# Patient Record
Sex: FEMALE | Race: WHITE | Employment: PART TIME | URBAN - METROPOLITAN AREA
[De-identification: names, ages, dates, MRNs, and addresses within clinical notes are randomized per-mention and may not be internally consistent; named-entity substitution may affect disease eponyms.]

---

## 2017-03-09 PROBLEM — Z3A.23 23 WEEKS GESTATION OF PREGNANCY: Status: ACTIVE | Noted: 2017-03-09

## 2017-03-09 PROBLEM — Z82.79 FAMILY HISTORY OF AUTOSOMAL ANEUPLOIDY: Status: ACTIVE | Noted: 2017-03-09

## 2017-03-09 PROBLEM — Z87.442 PERSONAL HISTORY OF KIDNEY STONES: Status: ACTIVE | Noted: 2017-03-09

## 2017-05-18 PROBLEM — Z3A.23 23 WEEKS GESTATION OF PREGNANCY: Status: RESOLVED | Noted: 2017-03-09 | Resolved: 2017-05-18

## 2017-05-18 PROBLEM — Z3A.33 33 WEEKS GESTATION OF PREGNANCY: Status: ACTIVE | Noted: 2017-05-18

## 2018-06-17 ENCOUNTER — HOSPITAL ENCOUNTER (EMERGENCY)
Age: 28
Discharge: HOME | End: 2018-06-17
Payer: COMMERCIAL

## 2018-06-17 VITALS
SYSTOLIC BLOOD PRESSURE: 117 MMHG | TEMPERATURE: 98.3 F | WEIGHT: 128 LBS | OXYGEN SATURATION: 98 % | BODY MASS INDEX: 24.19 KG/M2 | DIASTOLIC BLOOD PRESSURE: 80 MMHG | HEART RATE: 87 BPM | RESPIRATION RATE: 16 BRPM

## 2018-06-17 DIAGNOSIS — L50.9 URTICARIA: Primary | ICD-10-CM

## 2018-06-17 PROCEDURE — 99212 OFFICE O/P EST SF 10 MIN: CPT

## 2018-06-17 RX ORDER — DIPHENHYDRAMINE HCL 25 MG
25 CAPSULE ORAL EVERY 6 HOURS PRN
COMMUNITY
End: 2019-04-25 | Stop reason: ALTCHOICE

## 2018-06-17 RX ORDER — PREDNISONE 10 MG/1
TABLET ORAL
Qty: 20 TABLET | Refills: 0 | Status: SHIPPED | OUTPATIENT
Start: 2018-06-17 | End: 2019-04-25 | Stop reason: ALTCHOICE

## 2018-06-17 RX ORDER — FAMOTIDINE 20 MG/1
20 TABLET, FILM COATED ORAL 2 TIMES DAILY
Qty: 14 TABLET | Refills: 0 | Status: SHIPPED | OUTPATIENT
Start: 2018-06-17 | End: 2019-04-25 | Stop reason: ALTCHOICE

## 2019-02-15 ENCOUNTER — APPOINTMENT (OUTPATIENT)
Dept: GENERAL RADIOLOGY | Age: 29
End: 2019-02-15
Payer: COMMERCIAL

## 2019-02-15 ENCOUNTER — HOSPITAL ENCOUNTER (EMERGENCY)
Age: 29
Discharge: HOME | End: 2019-02-15
Payer: COMMERCIAL

## 2019-02-15 VITALS
DIASTOLIC BLOOD PRESSURE: 68 MMHG | WEIGHT: 125 LBS | SYSTOLIC BLOOD PRESSURE: 102 MMHG | RESPIRATION RATE: 16 BRPM | OXYGEN SATURATION: 98 % | HEART RATE: 85 BPM | BODY MASS INDEX: 23.62 KG/M2 | TEMPERATURE: 97.5 F

## 2019-02-15 DIAGNOSIS — S92.515A CLOSED NONDISPLACED FRACTURE OF PROXIMAL PHALANX OF LESSER TOE OF LEFT FOOT, INITIAL ENCOUNTER: Primary | ICD-10-CM

## 2019-02-15 PROCEDURE — 73630 X-RAY EXAM OF FOOT: CPT

## 2019-02-15 PROCEDURE — 99212 OFFICE O/P EST SF 10 MIN: CPT

## 2019-02-15 PROCEDURE — 6370000000 HC RX 637 (ALT 250 FOR IP): Performed by: NURSE PRACTITIONER

## 2019-02-15 RX ORDER — HYDROCODONE BITARTRATE AND ACETAMINOPHEN 5; 325 MG/1; MG/1
1 TABLET ORAL EVERY 6 HOURS PRN
Qty: 12 TABLET | Refills: 0 | Status: SHIPPED | OUTPATIENT
Start: 2019-02-15 | End: 2019-02-18

## 2019-02-15 RX ADMIN — IBUPROFEN 600 MG: 400 TABLET ORAL at 16:42

## 2019-02-15 ASSESSMENT — PAIN SCALES - GENERAL
PAINLEVEL_OUTOF10: 9
PAINLEVEL_OUTOF10: 9

## 2019-02-15 ASSESSMENT — PAIN DESCRIPTION - PAIN TYPE: TYPE: ACUTE PAIN

## 2019-02-15 ASSESSMENT — PAIN DESCRIPTION - ORIENTATION: ORIENTATION: LEFT

## 2019-02-15 ASSESSMENT — PAIN DESCRIPTION - LOCATION: LOCATION: FOOT

## 2019-04-25 ENCOUNTER — HOSPITAL ENCOUNTER (EMERGENCY)
Age: 29
Discharge: HOME | End: 2019-04-25
Payer: COMMERCIAL

## 2019-04-25 ENCOUNTER — APPOINTMENT (OUTPATIENT)
Dept: CT IMAGING | Age: 29
End: 2019-04-25
Payer: COMMERCIAL

## 2019-04-25 VITALS
WEIGHT: 130 LBS | OXYGEN SATURATION: 98 % | DIASTOLIC BLOOD PRESSURE: 55 MMHG | HEIGHT: 62 IN | SYSTOLIC BLOOD PRESSURE: 104 MMHG | TEMPERATURE: 98.1 F | BODY MASS INDEX: 23.92 KG/M2 | HEART RATE: 68 BPM | RESPIRATION RATE: 18 BRPM

## 2019-04-25 DIAGNOSIS — S00.93XA CONTUSION OF HEAD, UNSPECIFIED PART OF HEAD, INITIAL ENCOUNTER: Primary | ICD-10-CM

## 2019-04-25 DIAGNOSIS — S16.1XXA CERVICAL STRAIN, ACUTE, INITIAL ENCOUNTER: ICD-10-CM

## 2019-04-25 LAB — HCG(URINE) PREGNANCY TEST: NEGATIVE

## 2019-04-25 PROCEDURE — 72125 CT NECK SPINE W/O DYE: CPT

## 2019-04-25 PROCEDURE — 81025 URINE PREGNANCY TEST: CPT

## 2019-04-25 PROCEDURE — 70450 CT HEAD/BRAIN W/O DYE: CPT

## 2019-04-25 PROCEDURE — 99212 OFFICE O/P EST SF 10 MIN: CPT

## 2019-04-25 RX ORDER — CYCLOBENZAPRINE HCL 10 MG
10 TABLET ORAL 3 TIMES DAILY PRN
Qty: 15 TABLET | Refills: 0 | Status: SHIPPED | OUTPATIENT
Start: 2019-04-25 | End: 2020-08-12

## 2019-04-25 ASSESSMENT — PAIN DESCRIPTION - PAIN TYPE: TYPE: ACUTE PAIN

## 2019-04-25 ASSESSMENT — PAIN DESCRIPTION - LOCATION: LOCATION: HEAD;NECK;SHOULDER

## 2019-04-25 ASSESSMENT — PAIN DESCRIPTION - ORIENTATION: ORIENTATION: RIGHT;LEFT

## 2019-04-25 ASSESSMENT — PAIN DESCRIPTION - FREQUENCY: FREQUENCY: CONTINUOUS

## 2019-04-25 ASSESSMENT — PAIN DESCRIPTION - PROGRESSION: CLINICAL_PROGRESSION: GRADUALLY WORSENING

## 2019-04-25 ASSESSMENT — PAIN SCALES - GENERAL: PAINLEVEL_OUTOF10: 5

## 2019-04-25 ASSESSMENT — PAIN DESCRIPTION - ONSET: ONSET: SUDDEN

## 2019-04-25 NOTE — ED NOTES
Sent to [facility] for CT Scan via private car with  driving.      JOLENE Silverman  91/31/91 [id number]

## 2019-04-25 NOTE — ED PROVIDER NOTES
This is a 38-year-old female presents to urgent care with a complaint of head pain and neck pain for the past 5 days. She states that she was at a park and went to stand up and she was underneath a ledge and hit her head on a rock. There was no loss of consciousness. She states she had pain at the time but as the days have gone on the pain has never really gone away at times she does feel little bit lightheaded. She denies any blurred vision no change in mental status according to the family no nausea or vomiting. She does state radiates to the neck area she did state from a low bit of tingling in the back and arm areas but no weakness. She denies any chest pain or shortness of breath no abdominal pain. On 1st contact the patient she appears to be in no acute distress she's been taking over-the-counter medications. Review of Systems   Constitutional:        Pertinent positives and negatives are stated within HPI, all other systems reviewed and are negative. Physical Exam   Constitutional: She is oriented to person, place, and time. She appears well-developed and well-nourished. HENT:   Head: Normocephalic and atraumatic. Head is without raccoon's eyes, without Guajardo's sign, without abrasion, without laceration, without right periorbital erythema and without left periorbital erythema. Hair is normal.   Right Ear: Hearing, tympanic membrane, external ear and ear canal normal. No mastoid tenderness. No hemotympanum. Left Ear: Hearing, tympanic membrane, external ear and ear canal normal. No mastoid tenderness. No hemotympanum. Nose: Nose normal. Right sinus exhibits no maxillary sinus tenderness and no frontal sinus tenderness. Left sinus exhibits no maxillary sinus tenderness and no frontal sinus tenderness.    Mouth/Throat: Uvula is midline, oropharynx is clear and moist and mucous membranes are normal.   She has some mild tenderness to the she has some mild tenderness to the vertex of her scalp although I cannot find any cephalohematoma there or open area. Eyes: Pupils are equal, round, and reactive to light. Conjunctivae, EOM and lids are normal.   Neck: Trachea normal. Neck supple. Spinous process tenderness and muscular tenderness present. No neck rigidity. Decreased range of motion present. No edema and no erythema present. Most of the pain in the neck however is bilateral in the trapezius areas. Cardiovascular: Normal rate, regular rhythm and normal heart sounds. No murmur heard. Pulmonary/Chest: Effort normal and breath sounds normal.   Abdominal: Soft. Bowel sounds are normal. There is no tenderness. There is no rigidity, no rebound, no guarding and no CVA tenderness. Musculoskeletal: She exhibits no edema. She does have some soft tissue tenderness mostly in the trapezius areas she has full range of motion of her extremities muscle strength bilaterally is 5 out of 5 reflexes +3. Denies any sensory deficits in her extremities. Is able to walk without difficulty. No abdominal pain chest pain or back pain or hip pain. Neurological: She is alert and oriented to person, place, and time. She has normal strength. No cranial nerve deficit or sensory deficit. Coordination and gait normal. GCS eye subscore is 4. GCS verbal subscore is 5. GCS motor subscore is 6. Skin: Skin is warm and dry. No abrasion and no rash noted. Nursing note and vitals reviewed. Procedures    MDM  Number of Diagnoses or Management Options  Cervical strain, acute, initial encounter:   Contusion of head, unspecified part of head, initial encounter:   Diagnosis management comments: Patient was sent to the hospital for outpatient CT of the head and neck which were negative. I talked to her on the phone and gave her discharge instructions I will prescribe a muscle relaxer as well and they'll be sent to her pharmacy.  She'll be discharged in good condition.           --------------------------------------------- PAST HISTORY ---------------------------------------------  Past Medical History:  has a past medical history of Kidney stone. Past Surgical History:  has a past surgical history that includes Appendectomy; Lithotripsy; and Ectopic pregnancy surgery. Social History:  reports that she quit smoking about 3 years ago. Her smoking use included cigarettes. She has never used smokeless tobacco. She reports that she does not drink alcohol or use drugs. Family History: family history is not on file. The patients home medications have been reviewed. Allergies: Patient has no known allergies. -------------------------------------------------- RESULTS -------------------------------------------------  Results for orders placed or performed during the hospital encounter of 04/25/19   Pregnancy, Urine   Result Value Ref Range    HCG(Urine) Pregnancy Test NEGATIVE NEGATIVE     CT CERVICAL SPINE WO CONTRAST   Final Result   Head:   1. No acute intracranial findings. Cervical Spine:   1. No acute findings. CT Head WO Contrast   Final Result   Head:   1. No acute intracranial findings. Cervical Spine:   1. No acute findings. ------------------------- NURSING NOTES AND VITALS REVIEWED ---------------------------   The nursing notes within the ED encounter and vital signs as below have been reviewed. BP (!) 104/55   Pulse 68   Temp 98.1 °F (36.7 °C) (Oral)   Resp 18   Ht 5' 1.5\" (1.562 m)   Wt 130 lb (59 kg)   LMP 04/13/2019   SpO2 98%   BMI 24.17 kg/m²   Oxygen Saturation Interpretation: Normal      ------------------------------------------ PROGRESS NOTES ------------------------------------------   I have spoken with the patient and  and discussed todays results, in addition to providing specific details for the plan of care and counseling regarding the diagnosis and prognosis.   Their questions are answered at this time and they are agreeable with the

## 2020-08-12 ENCOUNTER — OFFICE VISIT (OUTPATIENT)
Dept: PAIN MANAGEMENT | Age: 30
End: 2020-08-12
Payer: COMMERCIAL

## 2020-08-12 ENCOUNTER — PREP FOR PROCEDURE (OUTPATIENT)
Dept: PAIN MANAGEMENT | Age: 30
End: 2020-08-12

## 2020-08-12 VITALS
BODY MASS INDEX: 23.92 KG/M2 | OXYGEN SATURATION: 95 % | HEART RATE: 102 BPM | WEIGHT: 130 LBS | HEIGHT: 62 IN | DIASTOLIC BLOOD PRESSURE: 70 MMHG | RESPIRATION RATE: 16 BRPM | SYSTOLIC BLOOD PRESSURE: 112 MMHG | TEMPERATURE: 97.5 F

## 2020-08-12 PROBLEM — G35 MS (MULTIPLE SCLEROSIS) (HCC): Status: ACTIVE | Noted: 2020-08-12

## 2020-08-12 PROBLEM — M43.02 CERVICAL SPONDYLOLYSIS: Status: ACTIVE | Noted: 2020-08-12

## 2020-08-12 PROBLEM — M79.2 NEURALGIA AND NEURITIS: Status: ACTIVE | Noted: 2020-08-12

## 2020-08-12 PROCEDURE — 99204 OFFICE O/P NEW MOD 45 MIN: CPT | Performed by: ANESTHESIOLOGY

## 2020-08-12 RX ORDER — GABAPENTIN 300 MG/1
CAPSULE ORAL
COMMUNITY
Start: 2020-07-12

## 2020-08-12 RX ORDER — IBUPROFEN 200 MG
200 TABLET ORAL EVERY 6 HOURS PRN
COMMUNITY

## 2020-08-12 RX ORDER — DULOXETIN HYDROCHLORIDE 60 MG/1
CAPSULE, DELAYED RELEASE ORAL
COMMUNITY
Start: 2020-07-17

## 2020-08-12 RX ORDER — TIZANIDINE 2 MG/1
2 TABLET ORAL EVERY 6 HOURS PRN
COMMUNITY
Start: 2020-08-11

## 2020-08-12 RX ORDER — MAGNESIUM OXIDE 400 MG/1
400 TABLET ORAL DAILY
COMMUNITY

## 2020-08-12 RX ORDER — SUMATRIPTAN 50 MG/1
TABLET, FILM COATED ORAL
COMMUNITY
Start: 2019-09-25

## 2020-08-12 RX ORDER — MULTIVIT-MIN/IRON/FOLIC ACID/K 18-600-40
CAPSULE ORAL
COMMUNITY

## 2020-08-12 RX ORDER — BUSPIRONE HYDROCHLORIDE 30 MG/1
TABLET ORAL
COMMUNITY
Start: 2020-07-14

## 2020-08-12 RX ORDER — VIT C/B6/B5/MAGNESIUM/HERB 173 50-5-6-5MG
CAPSULE ORAL
COMMUNITY

## 2020-08-12 RX ORDER — DEXTROAMPHETAMINE SACCHARATE, AMPHETAMINE ASPARTATE MONOHYDRATE, DEXTROAMPHETAMINE SULFATE AND AMPHETAMINE SULFATE 2.5; 2.5; 2.5; 2.5 MG/1; MG/1; MG/1; MG/1
5 CAPSULE, EXTENDED RELEASE ORAL EVERY MORNING
COMMUNITY
Start: 2020-08-04

## 2020-08-12 RX ORDER — CHLORAL HYDRATE 500 MG
CAPSULE ORAL
COMMUNITY
End: 2020-09-29 | Stop reason: ALTCHOICE

## 2020-08-12 NOTE — PROGRESS NOTES
Patient:  KACIE Wick 1990  Date of Service:  20      Do you currently have any of the following:    Fever: No  Headache:  No  Cough: No  Shortness of breath: No  Exposed to anyone with these symptoms: No       Patient presents with complaints of neck and shoulder blade pain that started 1 years ago and has been getting worse. She states the pain began following No specific cause    Pain is constant and is described as throbbing, shooting, sharp and burning. She rates the pain as a 9/10 on her worst day , 3/10 on her best day, and a 7/10 on average on the VAS scale. Pain does radiate to the upper extremities. She  has numbness, tingling, weakness of the the upper extremities. Alleviating factors include: nothing. Aggravating factors include:  cold, walking, standing, bending, lifting. She states that the pain does keep her from sleeping at night. She took her last dose of Neurontin this AM.     She is not on NSAIDS and  is not on anticoagulation medications to include none and is managed by NA. Previous treatments: Physical Therapy and medications. .      Personal Expectations from this treatment: increase activity and decrease pain    /70   Pulse 102   Temp 97.5 °F (36.4 °C) (Infrared)   Resp 16   Ht 5' 1.5\" (1.562 m)   Wt 130 lb (59 kg)   LMP 2020   SpO2 95%   BMI 24.17 kg/m²     Patient's last menstrual period was 2020.

## 2020-08-12 NOTE — PROGRESS NOTES
[address]        [address] [facility], [address] [facility]      [phone]                  Consult Note      Patient:  KACIE Virgen 1990    Date of Service:  20     Requesting Physician:  TAMIKA Adams -*    Reason for Consult:      Patient presents with complaints of neck pain    HISTORY OF PRESENT ILLNESS:      Ms. Gaby Rainey is a 26 y.o. female [facility] of Neck pain. H/o MS-diagnosed > a year ago: treated at [facility] neurolog[facility]. Has left sided numbness and sensory changes. Pain mainly over the left side of the neck and shoulder blade- occ UE symptoms but predominant pain over the neck. Pain for over a year. Pain is constant and is described as aching, sharp, stabbing and throbbing. She  has numbness, tingling, weakness of the left side of the body. Patient does not have bladder or bowel dysfunction. Alleviating factors include: rest.  Aggravating factors include: movement, bending, lifting. Pain causes functional limitations/ limits Adl's : Yes     Nursing notes and details of the pain history reviewed. Please see intake notes for details. Previous treatments:   Physical Therapy : yes, > 6 weeks and continues HEP. Continues regular HEP/ swimming. Chiropractic treatment: yes    Medications: - NSAID's : yes            - Membrane stabilizers : yes - Gabapentin            - Opioids : no            - Adjuvants or Others : yes    TENS Unit: yes    Surgeries: no- C spine surgery    Interventional Pain procedures/ nerve blocks: no      She has not been on anticoagulation medications no. She has been on herbal supplements- tumeric. She is not diabetic. H/O Smoking: denies  H/O alcohol abuse : denies  H/O Illicit drug use : denies. She has used Medical Marijuana- cream: not helped    Employment: employed- pharmacy tech.     Imaging:     MRI of Cervical spine and Brain: 8/11/2020:  [facility]  Result Impression   IMPRESSION:    Multiple intracranial white matter lesions compatible with multiple   sclerosis.   No new T2 lesions and no new enhancing lesions.  No   significant parenchymal volume loss. Other Significant Intracranial Findings:  None    Stable scattered T2 hyperintense foci without associated abnormal   enhancement involving the cervical cord, compatible with chronic   demyelination.  No new T2 intramedullary lesions and no new enhancing   intramedullary lesions.  No significant volume loss of the upper spinal   cord for age. Other Significant Cervical Spine Findings:  No significant cervical canal   or foraminal stenosis. Cervical Anatomic Variant:  None.  Assume 7 cervical vertebrae with   counting from the craniocervical junction. CT head and CT Cervical spein: 4/25/2019:  Impression    Head:    1. No acute intracranial findings.         Cervical Spine:    1. No acute findings. Past Medical History:   Diagnosis Date    Headache     Kidney stone     Multiple sclerosis ([address]     Neck pain     Osteoarthritis        Past Surgical History:   Procedure Laterality Date    APPENDECTOMY      CARPAL TUNNEL RELEASE      ECTOPIC PREGNANCY SURGERY      LITHOTRIPSY         Prior to Admission medications    Medication Sig Start Date End Date Taking?  Authorizing Provider   gabapentin (NEURONTIN) 300 MG capsule TK 1 C PO BID AND 2 CS QHS 7/12/20  Yes Historical Provider, MD   busPIRone (BUSPAR) 30 MG tablet TK 1 T PO  TID PRN 7/14/20  Yes Historical Provider, MD   DULoxetine (CYMBALTA) 60 MG extended release capsule TK 1 C PO QD 7/17/20  Yes Historical Provider, MD   amphetamine-dextroamphetamine (ADDERALL XR) 10 MG extended release capsule TK ONE C PO  QHS 8/4/20  Yes Historical Provider, MD   ibuprofen (ADVIL;MOTRIN) 200 MG tablet Take 200 mg by mouth every 6 hours as needed   Yes Historical Provider, MD   nystatin (MYCOSTATIN) 422577 UNIT/ML suspension SWISH AND SWALLOW 5 ML PO QID 20  Yes Historical Provider, MD   SUMAtriptan (IMITREX) 50 MG tablet Take by mouth 19  Yes Historical Provider, MD   tiZANidine (ZANAFLEX) 2 MG tablet  20  Yes Historical Provider, MD   Cholecalciferol (VITAMIN D) 50 MCG (2000 UT) CAPS capsule Take by mouth   Yes Historical Provider, MD   magnesium oxide (MAG-OX) 400 MG tablet Take 400 mg by mouth daily   Yes Historical Provider, MD   [place]-3 1000 MG CAPS Take by mouth   Yes Historical Provider, MD   Turmeric 500 MG CAPS Take by mouth   Yes Historical Provider, MD   Probiotic Product (PRO-BIOTIC BLEND) CAPS Take by mouth   Yes Historical Provider, MD   NONFORMULARY CBD/THC cream, applies daily.    Yes Historical Provider, MD   Aspirin-Acetaminophen-Caffeine (EXCEDRIN PO) Take by mouth   Yes Historical Provider, MD       No Known Allergies    Social History     Socioeconomic History    Marital status:      Spouse name: Not on file    Number of children: Not on file    Years of education: Not on file    Highest education level: Not on file   Occupational History    Not on file   Social Needs    Financial resource strain: Not on file    Food insecurity     Worry: Not on file     Inability: Not on file    Transportation needs     Medical: Not on file     Non-medical: Not on file   Tobacco Use    Smoking status: Former Smoker     Types: Cigarettes     Last attempt to quit: 2016     Years since quittin.3    Smokeless tobacco: Never Used   Substance and Sexual Activity    Alcohol use: No    Drug use: No     Comment: THC/CBD cream applies daily    Sexual activity: Yes   Lifestyle    Physical activity     Days per week: Not on file     Minutes per session: Not on file    Stress: Not on file   Relationships    Social connections     Talks on phone: Not on file     Gets together: Not on file     Attends Gnosticism service: Not on file     Active member of club or organization: Not on file     Attends meetings of clubs or organizations: Not on file     Relationship status: Not on file    Intimate partner violence     Fear of current or ex partner: Not on file     Emotionally abused: Not on file     Physically abused: Not on file     Forced sexual activity: Not on file   Other Topics Concern    Not on file   Social History Narrative    Not on file       Family History   Problem Relation Age of Onset    No Known Problems Father     No Known Problems Mother        REVIEW OF SYSTEMS:     Patient specifically denies fever/chills, chest pain, shortness of breath, new bowel or bladder complaints. All other review of systems was negative except as detailed below.   General ROS: negative  Psychological ROS: positive for - anxiety  Ophthalmic ROS: occ blurring of vision  ENT ROS: negative  Allergy and Immunology ROS: negative  Hematological and Lymphatic ROS: negative  Endocrine ROS: negative  Respiratory ROS: no cough, shortness of breath, or wheezing  Cardiovascular ROS: no chest pain or dyspnea on exertion  Gastrointestinal ROS: no abdominal pain, change in bowel habits, or black or bloody stools  Genito-Urinary ROS: no dysuria, trouble voiding, or hematuria  Musculoskeletal ROS: see HPI  Neurological ROS: no TIA or stroke symptoms and has h/o MS    Dermatological ROS: negative     PHYSICAL EXAMINATION:      /70   Pulse 102   Temp 97.5 °F (36.4 °C) (Infrared)   Resp 16   Ht 5' 1.5\" (1.562 m)   Wt 130 lb (59 kg)   LMP 08/07/2020   SpO2 95%   BMI 24.17 kg/m²     General:      General appearance:  Pleasant and well-hydrated, in no distress and A & O x 3  Build:Normal Weight  Function: Rises from seated position easily and Moves about room without difficulty    HEENT:    Head:normocephalic, atraumatic  Pupils:regular, round, equal  Sclera: icterus absent    Lungs:    Breathing:normal breathing pattern     CVS:     RRR    Abdomen:    Shape:non-distended and normal  Tenderness:none  Guarding:none    Cervical spine:    Inspection:normal  Palpation:tenderness paravertebral muscles, tenderness trapezium, left, right and positive. Range of motion:decreases flexion, extension, rotation left and is painful. Spurling's: negative bilaterally  Left sided facet loading +    Thoracic spine:     Spine inspection:normal   Palpation:No tenderness over the midline and paraspinal area, bilaterally  Range of motion:normal in flexion, extension rotation bilateral and is not painful. Lumbar spine:    Spine inspection: Normal   Palpation: Tenderness paravertebral muscles No bilaterally  Range of motion: Normal, flexion Normal, Lateral bending, extension and rotation bilaterally normal is not painful.   Sacroiliac joint tenderness No bilaterally  SLR : negative bilaterally  Trochanteric bursa tenderness: negative bilaterally  CVA tenderness:No     Musculoskeletal:    Trigger points in trapezius: No bilaterally  Trigger points in rhomboids: No bilaterally  Trigger points in Paravertebral: No bilaterally    Extremities:    Tremors:None bilaterally upper and lower  Edema:none x all 4 extremities    Knee:    ROM : no pain   Joint line tenderness : no    Neurological:    Sensory: Normal to light touch - except for patchy sensory changes over the left upper extremity and trunk    Motor:   Right  5/5              Left  4+/5               Right Bicep 5/5           Left Bicep 5/5              Right Triceps 5/5       Left Triceps 5/5          Right Deltoid 5/5     Left Deltoid 5/5                  Right Quadriceps 5/5          Left Quadriceps 5/5           Right Gastrocnemius 5/5    Left Gastrocnemius 5/5  Right Ant Tibialis 5/5  Left Ant Tibialis 5/5    Reflexes:    Right Brachioradialis reflex 2+  Left Brachioradialis reflex 2+  Right Biceps reflex 2+  Left Biceps reflex 2+  Right Triceps reflex 2+  Left Triceps reflex 2+  Right Quadriceps reflex 2+  Left Quadriceps reflex 2+  Right Achilles reflex 2+  Left Achilles reflex 2+    Gait:normal Yes    Dermatology:    Skin:no rashes or lesions noted    Assessment/Plan:     Diagnosis Orders   1. Cervical spondylolysis     2. Neuralgia and neuritis     3. MS (multiple sclerosis) ([address]       26 y.o. female with h/o MS followed at [facility] having chronic left sided neck pain. Features of cervical facet pain localized to C2-3, C3-4, C4-5 levels. Failed conservative treatment with meds/ PT. Continues HEP regularly. Exam: Left sided facet tenderness and + facet loading, patchy sensory changes over the left UE and trunk. Plan Dual diagnostic left cervical facet MBNB block at C2, C3, C4 & C5 MB. RBA discussed and the patient agreed to proceed. We will do the procedure with moderate sedation considering the invasive nature of the procedure and patient is anxious about needles. If short term relief, will do radiofrequency ablation. Continue HEP. If UE radicular pain noted in future, consider SYLVIA. Follow up with neurology at [facility]. Urine screen today: no    Counseling :    Patient encouraged to stay active and to watch/lose weight    Encouraged to continue Regular home exercise program as tolerated - stretching / strengthening. Treatment plan discussed with the patient including medication and procedure side effects. Controlled Substances Monitoring:     [place] reviewed- medical marijuana noted. Karyle Cooter, MD    Dear Ms. Deya Montgomery,   Thank you for referring Ms. Tiana Johnson and allowing us to participate in her care. Please do not hesitate to contact me if you have any questions regarding her care.     Genny Lubin MD    CC:    TAMIKA Michelle - CNP  [facility]

## 2020-08-14 ENCOUNTER — HOSPITAL ENCOUNTER (OUTPATIENT)
Age: 30
Discharge: HOME | End: 2020-08-16
Payer: COMMERCIAL

## 2020-08-14 PROCEDURE — U0003 INFECTIOUS AGENT DETECTION BY NUCLEIC ACID (DNA OR RNA); SEVERE ACUTE RESPIRATORY SYNDROME CORONAVIRUS 2 (SARS-COV-2) (CORONAVIRUS DISEASE [COVID-19]), AMPLIFIED PROBE TECHNIQUE, MAKING USE OF HIGH THROUGHPUT TECHNOLOGIES AS DESCRIBED BY CMS-2020-01-R: HCPCS

## 2020-08-16 LAB
SARS-COV-2: NOT DETECTED
SOURCE: NORMAL

## 2020-08-18 NOTE — PROGRESS NOTES
Have you been tested for COVID  Yes  Tested on 8-14-20 for OR scheduled 8-20-20          Have you been told you were positive for COVID No  Have you had any known exposure to someone that is positive for COVID No  Do you have a cough                   No              Do you have shortness of breath No                 Do you have a sore throat            No                Are you having chills                    No                Are you having muscle aches. No                    Please come to the hospital wearing a mask and have your significant other wear a mask as well. Both of you should check your temperature before leaving to come here,  if it is 100 or higher please call [phone] for instruction. [facility] PRE[facility] IN[facility]    The Preadmission Testing patient is instructed accordingly using the following criteria (check applicable):    ARRIVAL INSTRUCTIONS:  [x] Parking the day of Surgery is located in the Main Entrance lot. Upon entering the door, make an immediate right to the surgery reception desk    [] [phone] is available Monday through Friday 6 am to 6 pm    [x] Bring photo ID and insurance card    [] Bring in a copy of Living will or Durable Power of  papers.     [x] Please be sure to arrange for responsible adult to provide transportation to and from the hospital    [x] Please arrange for responsible adult to be with you for the 24 hour period post procedure due to having anesthesia      GENERAL INSTRUCTIONS:    [x] Nothing by mouth after midnight, including gum, candy, mints or water    [x] You may brush your teeth, but do not swallow any water    [x] Take medications as instructed with 1-2 oz of water    [x] Stop herbal supplements and vitamins 5 days prior to procedure    [x] Follow preop dosing of blood thinners per physician instructions    [] Take 1/2 dose of evening insulin, but no insulin after midnight    [] No oral diabetic medications after midnight    [] If diabetic and have low blood sugar or feel symptomatic, take 1-2oz apple juice only    [] Bring inhalers day of surgery    [] Bring C-PAP/ Bi-Pap day of surgery    [x] Bring urine specimen day of surgery    [x] Shower or bath with soap, lather and rinse well, AM of Surgery, no lotion, powders or creams to surgical site    [] Follow bowel prep as instructed per surgeon    [x] No tobacco products within 24 hours of surgery     [x] No alcohol or illegal drug use within 24 hours of surgery.     [x] Jewelry, body piercing's, eyeglasses, contact lenses and dentures are not permitted into surgery (bring cases)      [x] Please do not wear any nail polish, make up or hair products on the day of surgery    [x] If not already done, you can expect a call from registration    [x] You can expect a call the business day prior to procedure to notify you if your arrival time changes    [x] If you receive a survey after surgery we would greatly appreciate your comments    [] Parent/guardian of a minor must accompany their child and remain on the premises  the entire time they are under our care     [] Pediatric patients may bring favorite toy, blanket or comfort item with them    [] A caregiver or family member must remain with the patient during their stay if they are mentally handicapped, have dementia, disoriented or unable to use a call light or would be a safety concern if left unattended    [x] Please notify surgeon if you develop any illness between now and time of surgery (cold, cough, sore throat, fever, nausea, vomiting) or any signs of infections  including skin, wounds, and dental.    [x]  The Outpatient Pharmacy is available to fill your prescription here on your day of surgery, ask your preop nurse for details    [x] Other instructions  EDUCATIONAL MATERIALS PROVIDED:    [] PAT Preoperative Education Packet/Booklet     [] Medication List    [] Fluoroscopy Information Pamphlet    [] Transfusion bracelet applied with instructions    [] Joint replacement video reviewed    [] Shower with soap, lather and rinse well, and use CHG wipes provided the evening before surgery as instructed

## 2020-08-20 ENCOUNTER — HOSPITAL ENCOUNTER (OUTPATIENT)
Dept: GENERAL RADIOLOGY | Age: 30
Setting detail: OUTPATIENT SURGERY
Discharge: HOME | End: 2020-08-22
Attending: ANESTHESIOLOGY
Payer: COMMERCIAL

## 2020-08-20 ENCOUNTER — ANESTHESIA (OUTPATIENT)
Dept: OPERATING ROOM | Age: 30
End: 2020-08-20
Payer: COMMERCIAL

## 2020-08-20 ENCOUNTER — HOSPITAL ENCOUNTER (OUTPATIENT)
Age: 30
Setting detail: OUTPATIENT SURGERY
Discharge: HOME | End: 2020-08-20
Attending: ANESTHESIOLOGY | Admitting: ANESTHESIOLOGY
Payer: COMMERCIAL

## 2020-08-20 ENCOUNTER — ANESTHESIA EVENT (OUTPATIENT)
Dept: OPERATING ROOM | Age: 30
End: 2020-08-20
Payer: COMMERCIAL

## 2020-08-20 VITALS — SYSTOLIC BLOOD PRESSURE: 116 MMHG | OXYGEN SATURATION: 100 % | DIASTOLIC BLOOD PRESSURE: 72 MMHG

## 2020-08-20 VITALS
TEMPERATURE: 97.7 F | HEART RATE: 72 BPM | BODY MASS INDEX: 24.55 KG/M2 | OXYGEN SATURATION: 98 % | WEIGHT: 130 LBS | RESPIRATION RATE: 18 BRPM | HEIGHT: 61 IN | DIASTOLIC BLOOD PRESSURE: 71 MMHG | SYSTOLIC BLOOD PRESSURE: 112 MMHG

## 2020-08-20 LAB — HCG(URINE) PREGNANCY TEST: NEGATIVE

## 2020-08-20 PROCEDURE — 2709999900 HC NON-CHARGEABLE SUPPLY: Performed by: ANESTHESIOLOGY

## 2020-08-20 PROCEDURE — 6360000002 HC RX W HCPCS: Performed by: NURSE ANESTHETIST, CERTIFIED REGISTERED

## 2020-08-20 PROCEDURE — 81025 URINE PREGNANCY TEST: CPT

## 2020-08-20 PROCEDURE — 6360000002 HC RX W HCPCS: Performed by: ANESTHESIOLOGY

## 2020-08-20 PROCEDURE — 7100000010 HC PHASE II RECOVERY - FIRST 15 MIN: Performed by: ANESTHESIOLOGY

## 2020-08-20 PROCEDURE — 2580000003 HC RX 258: Performed by: NURSE ANESTHETIST, CERTIFIED REGISTERED

## 2020-08-20 PROCEDURE — 2500000003 HC RX 250 WO HCPCS: Performed by: ANESTHESIOLOGY

## 2020-08-20 PROCEDURE — 3700000000 HC ANESTHESIA ATTENDED CARE: Performed by: ANESTHESIOLOGY

## 2020-08-20 PROCEDURE — 3209999900 FLUORO FOR SURGICAL PROCEDURES

## 2020-08-20 PROCEDURE — 7100000011 HC PHASE II RECOVERY - ADDTL 15 MIN: Performed by: ANESTHESIOLOGY

## 2020-08-20 PROCEDURE — 3700000001 HC ADD 15 MINUTES (ANESTHESIA): Performed by: ANESTHESIOLOGY

## 2020-08-20 PROCEDURE — 64492 INJ PARAVERT F JNT C/T 3 LEV: CPT | Performed by: ANESTHESIOLOGY

## 2020-08-20 PROCEDURE — 3600000012 HC SURGERY LEVEL 2 ADDTL 15MIN: Performed by: ANESTHESIOLOGY

## 2020-08-20 PROCEDURE — 64491 INJ PARAVERT F JNT C/T 2 LEV: CPT | Performed by: ANESTHESIOLOGY

## 2020-08-20 PROCEDURE — 64490 INJ PARAVERT F JNT C/T 1 LEV: CPT | Performed by: ANESTHESIOLOGY

## 2020-08-20 PROCEDURE — 3600000002 HC SURGERY LEVEL 2 BASE: Performed by: ANESTHESIOLOGY

## 2020-08-20 RX ORDER — MIDAZOLAM HYDROCHLORIDE 1 MG/ML
INJECTION INTRAMUSCULAR; INTRAVENOUS PRN
Status: DISCONTINUED | OUTPATIENT
Start: 2020-08-20 | End: 2020-08-20 | Stop reason: SDUPTHER

## 2020-08-20 RX ORDER — DEXAMETHASONE SODIUM PHOSPHATE 10 MG/ML
INJECTION, SOLUTION INTRAMUSCULAR; INTRAVENOUS PRN
Status: DISCONTINUED | OUTPATIENT
Start: 2020-08-20 | End: 2020-08-20 | Stop reason: ALTCHOICE

## 2020-08-20 RX ORDER — SODIUM CHLORIDE 9 MG/ML
INJECTION, SOLUTION INTRAVENOUS CONTINUOUS PRN
Status: DISCONTINUED | OUTPATIENT
Start: 2020-08-20 | End: 2020-08-20 | Stop reason: SDUPTHER

## 2020-08-20 RX ORDER — LIDOCAINE HYDROCHLORIDE 5 MG/ML
INJECTION, SOLUTION INFILTRATION; INTRAVENOUS PRN
Status: DISCONTINUED | OUTPATIENT
Start: 2020-08-20 | End: 2020-08-20 | Stop reason: ALTCHOICE

## 2020-08-20 RX ORDER — BUPIVACAINE HYDROCHLORIDE 5 MG/ML
INJECTION, SOLUTION EPIDURAL; INTRACAUDAL PRN
Status: DISCONTINUED | OUTPATIENT
Start: 2020-08-20 | End: 2020-08-20 | Stop reason: ALTCHOICE

## 2020-08-20 RX ORDER — FENTANYL CITRATE 50 UG/ML
INJECTION, SOLUTION INTRAMUSCULAR; INTRAVENOUS PRN
Status: DISCONTINUED | OUTPATIENT
Start: 2020-08-20 | End: 2020-08-20 | Stop reason: SDUPTHER

## 2020-08-20 RX ADMIN — MIDAZOLAM 1 MG: 1 INJECTION INTRAMUSCULAR; INTRAVENOUS at 13:22

## 2020-08-20 RX ADMIN — FENTANYL CITRATE 25 MCG: 50 INJECTION, SOLUTION INTRAMUSCULAR; INTRAVENOUS at 13:29

## 2020-08-20 RX ADMIN — MIDAZOLAM 1 MG: 1 INJECTION INTRAMUSCULAR; INTRAVENOUS at 13:24

## 2020-08-20 RX ADMIN — FENTANYL CITRATE 50 MCG: 50 INJECTION, SOLUTION INTRAMUSCULAR; INTRAVENOUS at 13:24

## 2020-08-20 RX ADMIN — FENTANYL CITRATE 25 MCG: 50 INJECTION, SOLUTION INTRAMUSCULAR; INTRAVENOUS at 13:32

## 2020-08-20 RX ADMIN — SODIUM CHLORIDE: 9 INJECTION, SOLUTION INTRAVENOUS at 12:25

## 2020-08-20 ASSESSMENT — PAIN DESCRIPTION - DESCRIPTORS: DESCRIPTORS: ACHING

## 2020-08-20 ASSESSMENT — PAIN SCALES - GENERAL
PAINLEVEL_OUTOF10: 3
PAINLEVEL_OUTOF10: 3

## 2020-08-20 ASSESSMENT — PAIN DESCRIPTION - LOCATION: LOCATION: NECK

## 2020-08-20 ASSESSMENT — PAIN DESCRIPTION - PAIN TYPE
TYPE: ACUTE PAIN
TYPE: ACUTE PAIN

## 2020-08-20 ASSESSMENT — PAIN DESCRIPTION - DIRECTION: RADIATING_TOWARDS: LEFT ARM

## 2020-08-20 ASSESSMENT — PAIN - FUNCTIONAL ASSESSMENT: PAIN_FUNCTIONAL_ASSESSMENT: 0-10

## 2020-08-20 NOTE — PROGRESS NOTES
Discharge instructions given to pt and mother. verbalized agreement.  Pt stated mother is driving home and  will be with pt post op as ordered

## 2020-08-20 NOTE — ANESTHESIA PRE PROCEDURE
Department of Anesthesiology  Preprocedure Note       Name:  Brian Martinez   Age:  26 y.o.  :  1990                                          MRN:  [id number]         Date:  2020      Surgeon: Trent Melton):  Juanito Lomax MD    Procedure: Procedure(s):  LEFT CERVICAL MEDIAL BRANCH NERVE BLOCK UNDER FLUOROSCOPIC GUIDANCE AT C2,C3,C4 AND C5 (CPT 50217, 69230)    Medications prior to admission:   Prior to Admission medications    Medication Sig Start Date End Date Taking? Authorizing Provider   gabapentin (NEURONTIN) 300 MG capsule 300 mg in AM and afternoon, and 600 mg in PM 20  Yes Historical Provider, MD   busPIRone (BUSPAR) 30 MG tablet TK 1 T PO  TID PRN 20  Yes Historical Provider, MD   DULoxetine (CYMBALTA) 60 MG extended release capsule TK 1 C PO QD 20  Yes Historical Provider, MD   amphetamine-dextroamphetamine (ADDERALL XR) 10 MG extended release capsule Take 5 mg by mouth every morning. Takes for MS fatigue 20  Yes Historical Provider, MD   ibuprofen (ADVIL;MOTRIN) 200 MG tablet Take 200 mg by mouth every 6 hours as needed   Yes Historical Provider, MD   nystatin (MYCOSTATIN) 302359 UNIT/ML suspension SWISH AND SWALLOW 5 ML PO QID 20  Yes Historical Provider, MD   SUMAtriptan (IMITREX) 50 MG tablet Take by mouth 19  Yes Historical Provider, MD   tiZANidine (ZANAFLEX) 2 MG tablet Take 2 mg by mouth every 6 hours as needed  20  Yes Historical Provider, MD   Cholecalciferol (VITAMIN D) 50 MCG (2000 UT) CAPS capsule Take by mouth   Yes Historical Provider, MD   magnesium oxide (MAG-OX) 400 MG tablet Take 400 mg by mouth daily   Yes Historical Provider, MD   [place]-3 1000 MG CAPS Take by mouth   Yes Historical Provider, MD   Turmeric 500 MG CAPS Take by mouth   Yes Historical Provider, MD   Probiotic Product (PRO-BIOTIC BLEND) CAPS Take by mouth   Yes Historical Provider, MD   NONFORMULARY CBD/THC cream, applies daily.    Yes Historical Provider, MD Aspirin-Acetaminophen-Caffeine (EXCEDRIN PO) Take by mouth   Yes Historical Provider, MD       Current medications:    No current facility-administered medications for this encounter. Allergies:  No Known Allergies    Problem List:    Patient Active Problem List   Diagnosis Code    Family history of autosomal aneuploidy Z82.79    Personal history of kidney stones Z87.442    33 weeks gestation of pregnancy Z3A.33    Breech presentation O27. 1XX0    Cervical spondylolysis M43.02    Neuralgia and neuritis M79.2    MS (multiple sclerosis) ([facility] G35       Past Medical History:        Diagnosis Date    Headache     Multiple sclerosis ([address]     Neck pain     Osteoarthritis        Past Surgical History:        Procedure Laterality Date    APPENDECTOMY      CARPAL TUNNEL RELEASE      bilat     ECTOPIC PREGNANCY SURGERY      EYE SURGERY      Lasik bilat     LITHOTRIPSY         Social History:    Social History     Tobacco Use    Smoking status: Former Smoker     Types: Cigarettes     Last attempt to quit: 2016     Years since quittin.3    Smokeless tobacco: Never Used   Substance Use Topics    Alcohol use:  No                                Counseling given: Not Answered      Vital Signs (Current):   Vitals:    20 1412 20 1110   BP:  110/67   Pulse:  72   Resp:  14   Temp:  97.8 °F (36.6 °C)   TempSrc:  Temporal   SpO2:  97%   Weight: 130 lb (59 kg) 130 lb (59 kg)   Height: 5' 1\" (1.549 m) 5' 1\" (1.549 m)                                              BP Readings from Last 3 Encounters:   20 110/67   20 112/70   19 (!) 104/55       NPO Status: Time of last liquid consumption:                         Time of last solid consumption:                         Date of last liquid consumption: 20                        Date of last solid food consumption: 20    BMI:   Wt Readings from Last 3 Encounters:   20 130 lb (59 kg)   20 130 lb (59 kg)   04/25/19 130 lb (59 kg)     Body mass index is 24.56 kg/m². CBC:   Lab Results   Component Value Date    WBC 10.8 08/08/2017    RBC 4.35 08/08/2017    HGB 12.5 08/08/2017    HCT 37.2 08/08/2017    MCV 85.5 08/08/2017    RDW 15.1 08/08/2017     08/08/2017       CMP: No results found for: NA, K, CL, CO2, BUN, CREATININE, GFRAA, AGRATIO, LABGLOM, GLUCOSE, PROT, CALCIUM, BILITOT, ALKPHOS, AST, ALT    POC Tests: No results for input(s): POCGLU, POCNA, POCK, POCCL, POCBUN, POCHEMO, POCHCT in the last 72 hours. Coags: No results found for: PROTIME, INR, APTT    HCG (If Applicable):   Lab Results   Component Value Date    PREGTESTUR NEGATIVE 08/20/2020        ABGs: No results found for: PHART, PO2ART, EWP8HNS, SEG3KXR, BEART, E4QQPDXT     Type & Screen (If Applicable):  No results found for: LABABO, LABRH    Drug/Infectious Status (If Applicable):  No results found for: HIV, HEPCAB    COVID-19 Screening (If Applicable):   Lab Results   Component Value Date    COVID19 Not Detected 08/14/2020         Anesthesia Evaluation  Patient summary reviewed no history of anesthetic complications:   Airway: Mallampati: II  TM distance: >3 FB   Neck ROM: full  Mouth opening: > = 3 FB Dental: normal exam         Pulmonary:Negative Pulmonary ROS breath sounds clear to auscultation                             Cardiovascular:Negative CV ROS            Rhythm: regular             Beta Blocker:  Not on Beta Blocker         Neuro/Psych:   (+) neuromuscular disease: multiple sclerosis, headaches: migraine headaches, psychiatric history: stable with treatment            GI/Hepatic/Renal: Neg GI/Hepatic/Renal ROS            Endo/Other:    (+) : arthritis:., .                 Abdominal:           Vascular: negative vascular ROS. Anesthesia Plan      MAC     ASA 2       Induction: intravenous. Anesthetic plan and risks discussed with patient.       Plan discussed with [address], DO   8/20/2020

## 2020-08-20 NOTE — OP NOTE
Operative Note      Patient: Quentin Kaiser  YOB: 1990  MRN: [id number]    Date of Procedure: 2020    Pre-Op Diagnosis: CERVICAL SPONDYLOSIS    Post-Op Diagnosis: Same       Procedure(s):  LEFT CERVICAL MEDIAL BRANCH NERVE BLOCK UNDER FLUOROSCOPIC GUIDANCE AT C2,C3,C4 AND C5     Surgeon(s):  Dedrick Vasquez MD    Assistant:   * No surgical staff found *    Anesthesia: Monitor Anesthesia Care    Estimated Blood Loss (mL): Minimal    Complications: None    Specimens:   * No specimens in log *    Implants:  * No implants in log *      Drains: * No LDAs found *    Findings: good needle placement    Detailed Description of Procedure:   2020    Patient: Quentin Kaiser  :  1990  Age:  26 y.o. Sex:  female     PRE-PROCEDURE DIAGNOSIS:   Cervical facet syndrome, cervical spondylosis. POST-PROCEDURE DIAGNOSIS: Same. PROCEDURE: # 1  Left Cervical medial branch blocks at  Levels C2, C3, C4 and C5    SURGEON: Dedrick Vasquez MD    ANESTHESIA: MAC- see anesthesia records for details    ESTIMATED BLOOD LOSS:  None.  ______________________________________________________________________  BRIEF HISTORY:  Quentin Kaiser comes in today for Left cervical facet medial branch block at levels C2, C3, C4 and C5 . The potential complications of this procedure were discussed with her again today. She has elected to undergo the aforementioned procedure. Leslie complete History & Physical examination were reviewed in depth, a copy of which is in the chart. DESCRIPTION OF PROCEDURE:    After confirming written and informed consent, a time-out was performed and Zoes name and date of birth, the procedure to be performed as well as the plan for the location of the needle insertion were confirmed. The patient was brought into the procedure room and placed in the prone position on the fluoroscopy table.  Standard monitors were placed and vital signs were observed throughout the procedure. The area of the cervical spine was prepped with chloraprep and draped in the usual sterile manner. AP fluoroscopy views were used to identify and pranav the mid lateral aspect of the articular pillars of the targeted levels. The # 25 G 3.5 inch spinal needle was directed until bone was contacted at each level. Once bone was contacted, the needle was walked off laterally. Lateral fluoroscopy was then utilized during final needle positioning in placing the tip of the needle in the middle of the articular pillar. After negative aspiration was confirmed, 1 cc's of marcaine 0.5% and 2 mg of Dexamethasone was injected at each level. The needles were removed and the patient body part was cleaned and bandage was placed over the needle insertion. Disposition the patient tolerated the procedure well and there were no complications . Vital signs remained stable throughout the procedure. The patient was escorted to the recovery area where they remained until discharge and written discharge instructions for the procedure were given. Patient had excellent pain relief of > 90% immediately after the procedure. Plan: Liban Quan will return to our pain management center as scheduled.      Symone Vela MD

## 2020-08-20 NOTE — H&P
[facility]        [address] [facility], [address] [facility]  Dept: [phone]    Procedure History & Physical      Agusto Rivera     HPI:    Patient  is here for cervical facet MBNB for neck pain. Labs/imaging studies reviewed   All question and concerns addressed including R/B/A associated with the procedure    Past Medical History:   Diagnosis Date    Headache     Multiple sclerosis ([address]     Neck pain     Osteoarthritis        Past Surgical History:   Procedure Laterality Date    APPENDECTOMY      CARPAL TUNNEL RELEASE      bilat     ECTOPIC PREGNANCY SURGERY      EYE SURGERY      Lasik bilat     LITHOTRIPSY         Prior to Admission medications    Medication Sig Start Date End Date Taking? Authorizing Provider   gabapentin (NEURONTIN) 300 MG capsule 300 mg in AM and afternoon, and 600 mg in PM 7/12/20  Yes Historical Provider, MD   busPIRone (BUSPAR) 30 MG tablet TK 1 T PO  TID PRN 7/14/20  Yes Historical Provider, MD   DULoxetine (CYMBALTA) 60 MG extended release capsule TK 1 C PO QD 7/17/20  Yes Historical Provider, MD   amphetamine-dextroamphetamine (ADDERALL XR) 10 MG extended release capsule Take 5 mg by mouth every morning.  Takes for MS fatigue 8/4/20  Yes Historical Provider, MD   ibuprofen (ADVIL;MOTRIN) 200 MG tablet Take 200 mg by mouth every 6 hours as needed   Yes Historical Provider, MD   nystatin (MYCOSTATIN) 219904 UNIT/ML suspension SWISH AND SWALLOW 5 ML PO QID 7/16/20  Yes Historical Provider, MD   SUMAtriptan (IMITREX) 50 MG tablet Take by mouth 9/25/19  Yes Historical Provider, MD   tiZANidine (ZANAFLEX) 2 MG tablet Take 2 mg by mouth every 6 hours as needed  8/11/20  Yes Historical Provider, MD   Cholecalciferol (VITAMIN D) 50 MCG (2000 UT) CAPS capsule Take by mouth   Yes Historical Provider, MD   magnesium oxide (MAG-OX) 400 MG tablet Take 400 mg by mouth daily   Yes Historical Provider, MD   [place]-3 1000 MG CAPS Take by mouth   Yes Historical Provider,

## 2020-08-20 NOTE — ANESTHESIA POSTPROCEDURE EVALUATION
Department of Anesthesiology  Postprocedure Note    Patient: Alondra Quick  MRN: [id number]  YOB: 1990  Date of evaluation: 8/20/2020  Time:  2:17 PM     Procedure Summary     Date:  08/20/20 Room / Location:  [facility] PROCEDURE ROOM 02 / [address] [facility]    Anesthesia Start:  0074 Anesthesia Stop:  4423    Procedure:  LEFT CERVICAL MEDIAL BRANCH NERVE BLOCK UNDER FLUOROSCOPIC GUIDANCE AT C2,C3,C4 AND C5 (CPT 89683, 55851) (Left Neck) Diagnosis:  (CERVICAL SPONDYLOSIS)    Surgeon:  Maxi Maki MD Responsible Provider:  John Charles DO    Anesthesia Type:  MAC ASA Status:  2          Anesthesia Type: MAC    More Phase I: More Score: 10    More Phase II: More Score: 10    Last vitals: Reviewed and per EMR flowsheets.        Anesthesia Post Evaluation    Patient location during evaluation: PACU  Patient participation: complete - patient participated  Level of consciousness: awake and alert  Airway patency: patent  Nausea & Vomiting: no nausea and no vomiting  Complications: no  Cardiovascular status: hemodynamically stable  Respiratory status: acceptable  Hydration status: euvolemic

## 2020-09-09 ENCOUNTER — PREP FOR PROCEDURE (OUTPATIENT)
Dept: PAIN MANAGEMENT | Age: 30
End: 2020-09-09

## 2020-09-09 ENCOUNTER — VIRTUAL VISIT (OUTPATIENT)
Dept: PAIN MANAGEMENT | Age: 30
End: 2020-09-09
Payer: COMMERCIAL

## 2020-09-09 PROCEDURE — 99213 OFFICE O/P EST LOW 20 MIN: CPT | Performed by: ANESTHESIOLOGY

## 2020-09-09 NOTE — PROGRESS NOTES
Danilo Clinton was read the following message We want to confirm that, for purposes of billing, this is a virtual visit with your provider for which we will submit a claim for reimbursement with your insurance company. You will be responsible for any copays, coinsurance amounts or other amounts not covered by your insurance company. If you do not accept this, unfortunately we will not be able to schedule a virtual visit with the provider. Do you accept? Zoe responded Yes .

## 2020-09-09 NOTE — PROGRESS NOTES
[address] [place] [address], [address] [place] [address] [place]  [phone]    Tele health follow up Note      Cosmo Rios     Date of Visit:  9/9/2020    CC:  Tele health follow up   Chief Complaint   Patient presents with    Follow-up     level 1    Neck Pain       Consent:  Telehealth Visit due to Teena 19 pandemic  The patient and/or health care decision maker is aware that he/she may receive a bill for this tele-health service Doxy Me, depending on his insurance coverage, and has provided verbal consent to proceed: Yes  I have considered the risks of abuse, dependence, addiction and diversion. My patient is aware that they will need a follow-up visit (in-person or virtually) at the appropriate time indicated for continued medications. Further, my patient is aware that when this acute crisis has lifted, they will be expected to return for an in-person visit and all elements of standard local and hospital guidelines in order to continue this medication. Patient Location:Home   Physician Location: [address] [facility] office  Who helped the patient with the visit: none  Time documentation: yes  Platform used:  doxy. me      HPI:    History of chronic neck pain status post left-sided cervical facet diagnostic medial branch block at C2, C3, C4 and C5 on 8/20/2020 with almost 93% relief for few days. Patient had a significant improvement of pain and functionality noticed limited neck movement and stiffness. Now has noticed a recurrence of similar pain predominantly axial on the left side. Denies any new onset weakness or numbness. Denies any loss of bowel or bladder symptoms. She has been doing a home exercise program as tolerated. Previous notes and details of the pain history and findings of the imaging reviewed. H/o MS-diagnosed > a year ago: treated at [facility] neurolog[facility].     Has left sided numbness and sensory changes.     Previous treatments:   Physical Therapy : yes, > 6 weeks and continues HEP. Continues regular HEP/ swimming.     Chiropractic treatment: yes     Medications: - NSAID's : yes                       - Membrane stabilizers : yes - Gabapentin                       - Opioids : no                       - Adjuvants or Others : yes     TENS Unit: yes     Surgeries: no- C spine surgery     She has not been on anticoagulation medications no.     She has been on herbal supplements- tumeric.     She is not diabetic.     H/O Smoking: denies  H/O alcohol abuse : denies  H/O Illicit drug use : denies.     She has used Medical Marijuana- cream: not helped     Employment: employed- pharmacy tech.     Imaging:      MRI of Cervical spine and Brain: 8/11/2020:  [facility]  Result Impression   IMPRESSION:    Multiple intracranial white matter lesions compatible with multiple   sclerosis.   No new T2 lesions and no new enhancing lesions.  No   significant parenchymal volume loss. Other Significant Intracranial Findings:  None    Stable scattered T2 hyperintense foci without associated abnormal   enhancement involving the cervical cord, compatible with chronic   demyelination.  No new T2 intramedullary lesions and no new enhancing   intramedullary lesions.  No significant volume loss of the upper spinal   cord for age. Other Significant Cervical Spine Findings:  No significant cervical canal   or foraminal stenosis. Cervical Anatomic Variant:  None.  Assume 7 cervical vertebrae with   counting from the craniocervical junction.         CT head and CT Cervical spein: 4/25/2019:  Impression    Head:    1. No acute intracranial findings.         Cervical Spine:    1.  No acute findings.            Urine Drug Screening: no    OARRS report:  Reviewed 9/9/20     Past Medical History: Reviewed    Past Surgical History: Reviewed     Home Medications: Reviewed    Allergies: Reviewed     Social History: Reviewed     REVIEW OF SYSTEMS:     Giacomo Flow denies fever/chills, chest pain, shortness of breath, new bowel or bladder complaints. All other review of systems was negative. PHYSICAL EXAMINATION:      General:       A & O x3    HEENT:    Head:normocephalic and atraumatic    Lungs:    Breathing:Appears normal    Cervical spine:    Inspection:No deformities  Range of motion:reduced moderately flexion, extension rotation  and is  painful. Lumbar spine:    Range of motion:normal     Extremities:    Tremors:None bilaterally upper and lower    Neurological:     Motor:          No apparent new weakness per patient       [address] [facility]    Dermatology:    Skin:no unusual rashes    Assessment/Plan:      Diagnosis Orders   1. Cervical spondylolysis      2. Neuralgia and neuritis      3. [facility]) ([facility]        26 y.o. female with h/o MS followed at [facility] having chronic left sided neck pain.     Features of cervical facet pain localized to C2-3, C3-4, C4-5 levels.     Failed conservative treatment with meds/ PT. Continues HEP regularly.     Previous Exam findings shows: Left sided facet tenderness and + facet loading, patchy sensory changes over the left UE and trunk. S/P # 1 cervical facet MBNB C2,3,4,&5 under fluoroscopy with > 90 % pain relief for few days. Now has recurrence of similar pain- predominantly axial in nature.     Plan   #2 left cervical facet MBNB block at C2, C3, C4 & C5 MB under fluoroscopy. RBA discussed and the patient agreed to proceed.     We will do the procedure with moderate sedation considering the invasive nature of the procedure and patient is anxious about needles.     If short term relief, will do radiofrequency ablation.     Continue HEP.      If UE radicular pain noted in future, consider SLYVIA.     Follow up with neurology at [facility] reg MS.     Urine screen today: no     Counseling :     Patient encouraged to stay active and to watch/lose weight     Encouraged to continue Regular home exercise program as tolerated - stretching / strengthening.     Treatment plan discussed with the patient including medication and procedure side effects.     Controlled Substances Monitoring:      OARRS reviewed- medical marijuana noted. We discussed with the patient that combining pain meds, benzodiazepines, alcohol, illicit drugs or sleep aids increases the risk of respiratory depression including death. We discussed that these medications may cause drowsiness, sedation or dizziness and have counseled the patient not to drive or operate machinery. We have discussed that these medications will be prescribed only by one provider. We have discussed with the patient about age related risk factors and have thoroughly discussed the importance of taking these medications as prescribed. The patient verbalizes understanding. Patient advised regarding steps to help prevent the spread of COVID-19   SOURCE - https://connolly-carreon.info/. html     1-Stay home except to get medical care  2-Clean your hands often for atleast 20 seconds, avoid touching: Avoid touching your eyes, nose, and mouth with unwashed hands. 3-Seek medical attention: Seek prompt medical attention if your illness is worsening (e.g., difficulty breathing). Call you doctor first.  3-Wear a facemask if you are sick   4-Cover your coughs and sneezes           I affirm this is a Patient Initiated Episode with an established Patient who has not had a related appointment within my department in the past 7 days or scheduled within the next 24 hours. Total Time: > 15 mins including the time taken for counseling/ coordination of care and documentation.     Taylor Good MD    CC:  Ton Escobar, TAMIKA - CNP

## 2020-09-29 ENCOUNTER — TELEPHONE (OUTPATIENT)
Dept: PAIN MANAGEMENT | Age: 30
End: 2020-09-29

## 2020-09-29 NOTE — TELEPHONE ENCOUNTER
9-29-20-received a call from [facility] surgery [facility]. Sterling Quiroga has a history of MS so she would need a COVID test and she is scheduled for a procedure on 10-1-20 with Dr Nacho Escalona. Message sent to Dr aNcho Escalona, he states she does not need a COVID test, continue with the 10-1-20 procedure. The surgery center was notified, Sterling Quiroga was notified.     Sridevi Bear RN  Pain Management

## 2020-09-29 NOTE — PROGRESS NOTES
Have you been tested for COVID  No           Have you been told you were positive for COVID No  Have you had any known exposure to someone that is positive for COVID No  Do you have a cough                   No              Do you have shortness of breath No                 Do you have a sore throat            No                Are you having chills                    No                Are you having muscle aches. No                    Please come to the hospital wearing a mask and have your significant other wear a mask as well. Both of you should check your temperature before leaving to come here,  if it is 100 or higher please call [phone] for instruction. [facility] PRE-ADMISSION TESTING INSTRUCTIONS      ARRIVAL INSTRUCTIONS:  [x] Parking the day of Surgery is located in the Main Entrance lot. Upon entering the door, make an immediate right to the surgery reception desk    [x] Bring photo ID and insurance card    [x] Bring in a copy of Living will or Durable Power of  papers.     [x] Please be sure to arrange for responsible adult to provide transportation to and from the hospital    [x] Please arrange for responsible adult to be with you for the 24 hour period post procedure due to having anesthesia      GENERAL INSTRUCTIONS:    [x] Nothing by mouth after midnight, including gum, candy, mints or water    [x] You may brush your teeth, but do not swallow any water    [x] Take medications as instructed with 1-2 oz of water    [x] Stop herbal supplements and vitamins 5 days prior to procedure    [x] Follow preop dosing of blood thinners per physician instructions    [x] Shower or bath with soap, lather and rinse well, AM of Surgery, no lotion, powders or creams to surgical site    [x] Bring urine specimen with you    [x] Jewelry, body piercing's, eyeglasses, contact lenses and dentures are not permitted into surgery (bring cases)      [x] Please do not wear

## 2020-10-01 ENCOUNTER — HOSPITAL ENCOUNTER (OUTPATIENT)
Dept: GENERAL RADIOLOGY | Age: 30
Discharge: HOME | End: 2020-10-03
Attending: ANESTHESIOLOGY
Payer: COMMERCIAL

## 2020-10-01 ENCOUNTER — HOSPITAL ENCOUNTER (OUTPATIENT)
Age: 30
Setting detail: OUTPATIENT SURGERY
Discharge: HOME | End: 2020-10-01
Attending: ANESTHESIOLOGY | Admitting: ANESTHESIOLOGY
Payer: COMMERCIAL

## 2020-10-01 ENCOUNTER — ANESTHESIA EVENT (OUTPATIENT)
Dept: OPERATING ROOM | Age: 30
End: 2020-10-01
Payer: COMMERCIAL

## 2020-10-01 ENCOUNTER — ANESTHESIA (OUTPATIENT)
Dept: OPERATING ROOM | Age: 30
End: 2020-10-01
Payer: COMMERCIAL

## 2020-10-01 VITALS — SYSTOLIC BLOOD PRESSURE: 116 MMHG | OXYGEN SATURATION: 100 % | DIASTOLIC BLOOD PRESSURE: 73 MMHG

## 2020-10-01 VITALS
OXYGEN SATURATION: 98 % | TEMPERATURE: 96.8 F | WEIGHT: 130 LBS | HEIGHT: 61 IN | DIASTOLIC BLOOD PRESSURE: 67 MMHG | BODY MASS INDEX: 24.55 KG/M2 | HEART RATE: 71 BPM | SYSTOLIC BLOOD PRESSURE: 107 MMHG | RESPIRATION RATE: 18 BRPM

## 2020-10-01 LAB — HCG(URINE) PREGNANCY TEST: NEGATIVE

## 2020-10-01 PROCEDURE — 6360000002 HC RX W HCPCS

## 2020-10-01 PROCEDURE — 3700000000 HC ANESTHESIA ATTENDED CARE: Performed by: ANESTHESIOLOGY

## 2020-10-01 PROCEDURE — 3600000002 HC SURGERY LEVEL 2 BASE: Performed by: ANESTHESIOLOGY

## 2020-10-01 PROCEDURE — 81025 URINE PREGNANCY TEST: CPT

## 2020-10-01 PROCEDURE — 64490 INJ PARAVERT F JNT C/T 1 LEV: CPT | Performed by: ANESTHESIOLOGY

## 2020-10-01 PROCEDURE — 2580000003 HC RX 258: Performed by: NURSE ANESTHETIST, CERTIFIED REGISTERED

## 2020-10-01 PROCEDURE — 6360000002 HC RX W HCPCS: Performed by: ANESTHESIOLOGY

## 2020-10-01 PROCEDURE — 6360000002 HC RX W HCPCS: Performed by: NURSE ANESTHETIST, CERTIFIED REGISTERED

## 2020-10-01 PROCEDURE — 3209999900 FLUORO FOR SURGICAL PROCEDURES

## 2020-10-01 PROCEDURE — 64492 INJ PARAVERT F JNT C/T 3 LEV: CPT | Performed by: ANESTHESIOLOGY

## 2020-10-01 PROCEDURE — 3600000012 HC SURGERY LEVEL 2 ADDTL 15MIN: Performed by: ANESTHESIOLOGY

## 2020-10-01 PROCEDURE — 2500000003 HC RX 250 WO HCPCS: Performed by: ANESTHESIOLOGY

## 2020-10-01 PROCEDURE — 7100000011 HC PHASE II RECOVERY - ADDTL 15 MIN: Performed by: ANESTHESIOLOGY

## 2020-10-01 PROCEDURE — 3700000001 HC ADD 15 MINUTES (ANESTHESIA): Performed by: ANESTHESIOLOGY

## 2020-10-01 PROCEDURE — 64491 INJ PARAVERT F JNT C/T 2 LEV: CPT | Performed by: ANESTHESIOLOGY

## 2020-10-01 PROCEDURE — 2709999900 HC NON-CHARGEABLE SUPPLY: Performed by: ANESTHESIOLOGY

## 2020-10-01 PROCEDURE — 7100000010 HC PHASE II RECOVERY - FIRST 15 MIN: Performed by: ANESTHESIOLOGY

## 2020-10-01 RX ORDER — DEXAMETHASONE SODIUM PHOSPHATE 10 MG/ML
INJECTION, SOLUTION INTRAMUSCULAR; INTRAVENOUS PRN
Status: DISCONTINUED | OUTPATIENT
Start: 2020-10-01 | End: 2020-10-01 | Stop reason: ALTCHOICE

## 2020-10-01 RX ORDER — FENTANYL CITRATE 50 UG/ML
INJECTION, SOLUTION INTRAMUSCULAR; INTRAVENOUS PRN
Status: DISCONTINUED | OUTPATIENT
Start: 2020-10-01 | End: 2020-10-01 | Stop reason: SDUPTHER

## 2020-10-01 RX ORDER — MIDAZOLAM HYDROCHLORIDE 1 MG/ML
INJECTION INTRAMUSCULAR; INTRAVENOUS PRN
Status: DISCONTINUED | OUTPATIENT
Start: 2020-10-01 | End: 2020-10-01 | Stop reason: SDUPTHER

## 2020-10-01 RX ORDER — SODIUM CHLORIDE 9 MG/ML
INJECTION, SOLUTION INTRAVENOUS CONTINUOUS PRN
Status: DISCONTINUED | OUTPATIENT
Start: 2020-10-01 | End: 2020-10-01 | Stop reason: SDUPTHER

## 2020-10-01 RX ORDER — BUPIVACAINE HYDROCHLORIDE 5 MG/ML
INJECTION, SOLUTION EPIDURAL; INTRACAUDAL PRN
Status: DISCONTINUED | OUTPATIENT
Start: 2020-10-01 | End: 2020-10-01 | Stop reason: ALTCHOICE

## 2020-10-01 RX ORDER — LIDOCAINE HYDROCHLORIDE 5 MG/ML
INJECTION, SOLUTION INFILTRATION; INTRAVENOUS PRN
Status: DISCONTINUED | OUTPATIENT
Start: 2020-10-01 | End: 2020-10-01 | Stop reason: ALTCHOICE

## 2020-10-01 RX ADMIN — SODIUM CHLORIDE: 9 INJECTION, SOLUTION INTRAVENOUS at 11:41

## 2020-10-01 RX ADMIN — MIDAZOLAM 2 MG: 1 INJECTION INTRAMUSCULAR; INTRAVENOUS at 11:43

## 2020-10-01 RX ADMIN — FENTANYL CITRATE 100 MCG: 50 INJECTION, SOLUTION INTRAMUSCULAR; INTRAVENOUS at 11:43

## 2020-10-01 ASSESSMENT — PAIN SCALES - GENERAL: PAINLEVEL_OUTOF10: 2

## 2020-10-01 ASSESSMENT — PAIN - FUNCTIONAL ASSESSMENT: PAIN_FUNCTIONAL_ASSESSMENT: 0-10

## 2020-10-01 NOTE — OP NOTE
the procedure. The area of the cervical spine was prepped with chloraprep and draped in the usual sterile manner. AP fluoroscopy views were used to identify and pranav the mid lateral aspect of the articular pillars of the targeted levels. The # 25 G 2 inch spinal needles was directed until bone was contacted at each level. Once bone was contacted, the needle was walked off laterally. Lateral fluoroscopy was then utilized during final needle positioning in placing the tip of the needle in the middle of the articular pillar. After negative aspiration was confirmed, 1 cc's of marcaine 0.5% and 2 mg of Dexamethasone was injected at each level. The needles were removed and the patient body part was cleaned and bandage was placed over the needle insertion. Disposition the patient tolerated the procedure well and there were no complications . Vital signs remained stable throughout the procedure. The patient was escorted to the recovery area where they remained until discharge and written discharge instructions for the procedure were given. Plan: Jose C Adams will return to our pain management center as scheduled. Preprocedure pain: 4-6/10  Post procedure pain 0/10.     Sen Stapleton MD

## 2020-10-01 NOTE — PROGRESS NOTES
Went over discharge instructions with patient and family member. Both verbalized understanding of instructions.

## 2020-10-01 NOTE — H&P
JOLLY ZARAGOZA [facility] - [facility] Pain Management        [address] [facility], [address]  Dept: [phone]    Procedure History & Physical      Yousif Powers     HPI:    Patient  is here for cervical facet MBNB for neck pain. Labs/imaging studies reviewed   All question and concerns addressed including R/B/A associated with the procedure    Past Medical History:   Diagnosis Date    Headache     Multiple sclerosis ([address]     Neck pain     Osteoarthritis        Past Surgical History:   Procedure Laterality Date    ANESTHESIA NERVE BLOCK Left 8/20/2020    LEFT CERVICAL MEDIAL BRANCH NERVE BLOCK UNDER FLUOROSCOPIC GUIDANCE AT C2,C3,C4 AND C5 (CPT 65303, 17564) performed by Dannie Warren MD at [place] [address]      EYE SURGERY      Lasik bilat     LITHOTRIPSY         Prior to Admission medications    Medication Sig Start Date End Date Taking? Authorizing Provider   gabapentin (NEURONTIN) 300 MG capsule 300 mg in AM and afternoon, and 600 mg in PM 7/12/20  Yes Historical Provider, MD   busPIRone (BUSPAR) 30 MG tablet TK 1 T PO  TID PRN 7/14/20  Yes Historical Provider, MD   DULoxetine (CYMBALTA) 60 MG extended release capsule TK 1 C PO QD 7/17/20  Yes Historical Provider, MD   amphetamine-dextroamphetamine (ADDERALL XR) 10 MG extended release capsule Take 5 mg by mouth every morning. Takes for MS fatigue 8/4/20  Yes Historical Provider, MD   tiZANidine (ZANAFLEX) 2 MG tablet Take 2 mg by mouth every 6 hours as needed  8/11/20  Yes Historical Provider, MD   Cholecalciferol (VITAMIN D) 50 MCG (2000 UT) CAPS capsule Take by mouth   Yes Historical Provider, MD   Probiotic Product (PRO-BIOTIC BLEND) CAPS Take by mouth   Yes Historical Provider, MD   NONFORMULARY CBD/THC cream, applies daily.    Yes Historical Provider, MD   ibuprofen (ADVIL;MOTRIN) 200 MG tablet Take 200 mg by mouth every 6 hours as needed    Historical Provider, MD SUMAtriptan (IMITREX) 50 MG tablet Take by mouth 19   Historical Provider, MD   magnesium oxide (MAG-OX) 400 MG tablet Take 400 mg by mouth daily    Historical Provider, MD   Turmeric 500 MG CAPS Take by mouth    Historical Provider, MD   Aspirin-Acetaminophen-Caffeine (EXCEDRIN PO) Take by mouth    Historical Provider, MD       No Known Allergies    Social History     Socioeconomic History    Marital status:      Spouse name: Not on file    Number of children: Not on file    Years of education: Not on file    Highest education level: Not on file   Occupational History    Not on file   Social Needs    Financial resource strain: Not on file    Food insecurity     Worry: Not on file     Inability: Not on file    Transportation needs     Medical: Not on file     Non-medical: Not on file   Tobacco Use    Smoking status: Former Smoker     Types: Cigarettes     Last attempt to quit: 2016     Years since quittin.4    Smokeless tobacco: Never Used   Substance and Sexual Activity    Alcohol use: No    Drug use: No     Comment: THC/CBD cream applies daily-  20     Sexual activity: Not on file   Lifestyle    Physical activity     Days per week: Not on file     Minutes per session: Not on file    Stress: Not on file   Relationships    Social connections     Talks on phone: Not on file     Gets together: Not on file     Attends Scientologist service: Not on file     Active member of club or organization: Not on file     Attends meetings of clubs or organizations: Not on file     Relationship status: Not on file    Intimate partner violence     Fear of current or ex partner: Not on file     Emotionally abused: Not on file     Physically abused: Not on file     Forced sexual activity: Not on file   Other Topics Concern    Not on file   Social History Narrative    Not on file       Family History   Problem Relation Age of Onset    No Known Problems Father     No Known Problems Mother REVIEW OF SYSTEMS:    CONSTITUTIONAL:  negative for  fevers, chills, sweats and fatigue    RESPIRATORY:  negative for  dry cough, cough with sputum, dyspnea, wheezing and chest pain    CARDIOVASCULAR:  negative for chest pain, dyspnea, palpitations, syncope    GASTROINTESTINAL:  negative for nausea, vomiting, change in bowel habits, diarrhea, constipation and abdominal pain    MUSCULOSKELETAL: negative for muscle weakness    SKIN: negative for itching or rashes. BEHAVIOR/PSYCH:  negative for poor appetite, increased appetite, decreased sleep and poor concentration    All other systems negative      PHYSICAL EXAM:    VITALS:  /61   Pulse 69   Temp 96.4 °F (35.8 °C) (Temporal)   Resp 20   Ht 5' 1\" (1.549 m)   Wt 130 lb (59 kg)   LMP 09/15/2020   SpO2 100%   BMI 24.56 kg/m²     CONSTITUTIONAL:  awake, alert, cooperative, no apparent distress, and appears stated age    EYES: PERRLA, EOMI    LUNGS:  No increased work of breathing, no audible wheezing    CARDIOVASCULAR:  regular rate and rhythm    ABDOMEN:  Soft non tender non distended     EXTREMITIES: no signs of clubbing or cyanosis. MUSCULOSKELETAL: negative for flaccid muscle tone or spastic movements. SKIN: gross examination reveals no signs of rashes, or diaphoresis. NEURO: Cranial nerves II-XII grossly intact. No signs of agitated mood. Assessment/Plan:    Patient  is here for cervical facet MBNB for neck pain. The patient was counseled at length about the risks of eduardo Covid-19 during their perioperative period and any recovery window from their procedure. The patient was made aware that eduardo Covid-19  may worsen their prognosis for recovering from their procedure  and lend to a higher morbidity and/or mortality risk. All material risks, benefits, and reasonable alternatives including postponing the procedure were discussed. The patient does wish to proceed with the procedure at this time.       Marko Bailey Reji King MD

## 2020-10-01 NOTE — ANESTHESIA PRE PROCEDURE
Department of Anesthesiology  Preprocedure Note       Name:  Rachel Rouse   Age:  26 y.o.  :  1990                                          MRN:  [id number]         Date:  10/1/2020      Surgeon: Meme Florez):  Jazmine Gallo MD    Procedure: Procedure(s):  cervical facet MBNB    Medications prior to admission:   Prior to Admission medications    Medication Sig Start Date End Date Taking? Authorizing Provider   gabapentin (NEURONTIN) 300 MG capsule 300 mg in AM and afternoon, and 600 mg in PM 20   Historical Provider, MD   busPIRone (BUSPAR) 30 MG tablet TK 1 T PO  TID PRN 20   Historical Provider, MD   DULoxetine (CYMBALTA) 60 MG extended release capsule TK 1 C PO QD 20   Historical Provider, MD   amphetamine-dextroamphetamine (ADDERALL XR) 10 MG extended release capsule Take 5 mg by mouth every morning. Takes for MS fatigue 20   Historical Provider, MD   ibuprofen (ADVIL;MOTRIN) 200 MG tablet Take 200 mg by mouth every 6 hours as needed    Historical Provider, MD   SUMAtriptan (IMITREX) 50 MG tablet Take by mouth 19   Historical Provider, MD   tiZANidine (ZANAFLEX) 2 MG tablet Take 2 mg by mouth every 6 hours as needed  20   Historical Provider, MD   Cholecalciferol (VITAMIN D) 50 MCG ( UT) CAPS capsule Take by mouth    Historical Provider, MD   magnesium oxide (MAG-OX) 400 MG tablet Take 400 mg by mouth daily    Historical Provider, MD   Turmeric 500 MG CAPS Take by mouth    Historical Provider, MD   Probiotic Product (PRO-BIOTIC BLEND) CAPS Take by mouth    Historical Provider, MD   NONFORMULARY CBD/THC cream, applies daily. Historical Provider, MD   Aspirin-Acetaminophen-Caffeine (EXCEDRIN PO) Take by mouth    Historical Provider, MD       Current medications:    No current outpatient medications on file. No current facility-administered medications for this visit.         Allergies:  No Known Allergies    Problem List:    Patient Active Problem List Diagnosis Code    Family history of autosomal aneuploidy Z82.79    Personal history of kidney stones Z87.442    33 weeks gestation of pregnancy Z3A.33    Breech presentation O27. 1XX0    Cervical spondylolysis M43.02    Neuralgia and neuritis M79.2    MS (multiple [address]) ([facility] [address] [facility] G35       Past Medical History:        Diagnosis Date    Headache     Multiple sclerosis ([facility] [address] [facility]     Neck pain     Osteoarthritis        Past Surgical History:        Procedure Laterality Date    ANESTHESIA NERVE BLOCK Left 2020    LEFT CERVICAL MEDIAL BRANCH NERVE BLOCK UNDER FLUOROSCOPIC GUIDANCE AT C2,C3,C4 AND C5 (CPT 44163, 57985) performed by Marshal Wagner MD at [facility] OR    APPENDECTOMY      CARPAL TUNNEL RELEASE      bilat     ECTOPIC PREGNANCY SURGERY      EYE SURGERY      Lasik bilat     LITHOTRIPSY         Social History:    Social History     Tobacco Use    Smoking status: Former Smoker     Types: Cigarettes     Last attempt to quit: 2016     Years since quittin.4    Smokeless tobacco: Never Used   Substance Use Topics    Alcohol use: No                                Counseling given: Not Answered      Vital Signs (Current): There were no vitals filed for this visit.                                            BP Readings from Last 3 Encounters:   10/01/20 116/61   20 112/71   20 116/72       NPO Status:                                                                                 BMI:   Wt Readings from Last 3 Encounters:   10/01/20 130 lb (59 kg)   20 130 lb (59 kg)   20 130 lb (59 kg)     There is no height or weight on file to calculate BMI.    CBC:   Lab Results   Component Value Date    WBC 10.8 2017    RBC 4.35 2017    HGB 12.5 2017    HCT 37.2 2017    MCV 85.5 2017    RDW 15.1 2017     2017       CMP: No results found for: NA, K, CL, CO2, BUN, CREATININE, GFRAA, AGRATIO, LABGLOM, GLUCOSE, PROT, CALCIUM,

## 2020-10-02 NOTE — ANESTHESIA POSTPROCEDURE EVALUATION
Department of Anesthesiology  Postprocedure Note    Patient: Bowen Pierre  MRN: [id number]  YOB: 1990  Date of evaluation: 10/2/2020  Time:  9:26 AM     Procedure Summary     Date:  10/01/20 Room / Location:  [address] [place] [address]    Anesthesia Start:  2076 Anesthesia Stop:  8428    Procedure:  #2 LEFT CERVICAL MEDIAL BRANCH NERVE BLOCK UNDER FLUOROSCOPIC GUIDANCE AT C2, C3, C4 AND C5     CPT: 35198 02271 (Left Neck) Diagnosis:  (CERVICAL SPONDYLOLYSIS)    Surgeon:  Roberto Evangelista MD Responsible Provider:  Jessica Fernandez MD    Anesthesia Type:  MAC ASA Status:  2          Anesthesia Type: MAC    More Phase I: More Score: 10    More Phase II: More Score: 10    Last vitals: Reviewed and per EMR flowsheets.        Anesthesia Post Evaluation    Patient location during evaluation: PACU  Patient participation: complete - patient participated  Level of consciousness: awake and alert  Airway patency: patent  Nausea & Vomiting: no nausea and no vomiting  Complications: no  Cardiovascular status: hemodynamically stable  Respiratory status: acceptable  Hydration status: euvolemic

## 2020-10-21 ENCOUNTER — VIRTUAL VISIT (OUTPATIENT)
Dept: PAIN MANAGEMENT | Age: 30
End: 2020-10-21
Payer: COMMERCIAL

## 2020-10-21 ENCOUNTER — PREP FOR PROCEDURE (OUTPATIENT)
Dept: PAIN MANAGEMENT | Age: 30
End: 2020-10-21

## 2020-10-21 PROCEDURE — 99213 OFFICE O/P EST LOW 20 MIN: CPT | Performed by: ANESTHESIOLOGY

## 2020-10-21 NOTE — PROGRESS NOTES
[address] [facility], [address] [place] [address] [place]  [phone]    Tele health follow up Note      Rachel Rouse     Date of Visit:  10/21/2020    CC:  Tele health follow up   Chief Complaint   Patient presents with    Neck Pain     pain level 4       Consent:  Telehealth Visit due to Rigober[place] 19 pandemic  The patient and/or health care decision maker is aware that he/she may receive a bill for this tele-health service Doxy Me, depending on his insurance coverage, and has provided verbal consent to proceed: Yes  I have considered the risks of abuse, dependence, addiction and diversion. My patient is aware that they will need a follow-up visit (in-person or virtually) at the appropriate time indicated for continued medications. Further, my patient is aware that when this acute crisis has lifted, they will be expected to return for an in-person visit and all elements of standard local and hospital guidelines in order to continue this medication. Patient Location:Home   Physician Location: [address] [facility] office  Who helped the patient with the visit: none  Time documentation: yes  Platform used:  [email or web address] me      HPI:    History of chronic neck pain. Failed conservative treatment. S/P left-sided cervical facet diagnostic medial branch block X 2 on  8/20/2020 and 10/1/2020 at C2, C3, C4 and C5 with almost 90% relief for few days. Patient had significant improvement of pain and functionality noticed improvement in neck movement and stiffness. Now has noticed a recurrence of similar pain predominantly axial on the left side. She has been doing a home exercise program as tolerated. Denies any new onset weakness or numbness. Denies any loss of bowel or bladder symptoms. Previous notes and details of the pain history and findings of the imaging reviewed.     H/o MS-diagnosed > a year ago: treated at [facility] neurology.     Has chronic left sided numbness and sensory changes. Previous treatments:   Physical Therapy : yes, > 6 weeks and continues HEP. Continues regular HEP/ swimming.     Chiropractic treatment: yes     Medications: - NSAID's : yes                       - Membrane stabilizers : yes - Gabapentin                       - Opioids : no                       - Adjuvants or Others : yes     TENS Unit: yes     Surgeries: no- C spine surgery     She has not been on anticoagulation medications no.     She has been on herbal supplements- tumeric.     She is not diabetic.     H/O Smoking: denies  H/O alcohol abuse : denies  H/O Illicit drug use : denies.     She has used Medical Marijuana- cream: not helped     Employment: employed- pharmacy tech.     Imaging:      MRI of Cervical spine and Brain: 8/11/2020:  [facility]  Result Impression   IMPRESSION:    Multiple intracranial white matter lesions compatible with multiple   sclerosis.   No new T2 lesions and no new enhancing lesions.  No   significant parenchymal volume loss. Other Significant Intracranial Findings:  None    Stable scattered T2 hyperintense foci without associated abnormal   enhancement involving the cervical cord, compatible with chronic   demyelination.  No new T2 intramedullary lesions and no new enhancing   intramedullary lesions.  No significant volume loss of the upper spinal   cord for age. Other Significant Cervical Spine Findings:  No significant cervical canal   or foraminal stenosis. Cervical Anatomic Variant:  None.  Assume 7 cervical vertebrae with   counting from the craniocervical junction.         CT head and CT Cervical spein: 4/25/2019:  Impression    Head:    1. No acute intracranial findings.         Cervical Spine:    1.  No acute findings.            Urine Drug Screening: no    OARRS report:  Reviewed 10/21/20     Past Medical History: Reviewed    Past Surgical History: Reviewed     Home Medications: Reviewed    Allergies: Reviewed     Social History: Reviewed     REVIEW OF SYSTEMS:     Elver Gregorio denies fever/chills, chest pain, shortness of breath, new bowel or bladder complaints. All other review of systems was negative. PHYSICAL EXAMINATION:      General:       A & O x3    HEENT:    Head:normocephalic and atraumatic    Lungs:    Breathing:Appears normal    Cervical spine:    Inspection:No deformities  Range of motion:reduced moderately flexion, extension rotation  and is  painful. Lumbar spine:    Range of motion:normal     Extremities:    Tremors:None bilaterally upper and lower    Neurological:     Motor:          No apparent new weakness per patient       [facility]    Dermatology:    Skin:no unusual rashes    Assessment/Plan:      Diagnosis Orders   1. Cervical spondylolysis      2. Neuralgia and neuritis      3. MS (multiple sclerosis) ([facility] [address] [facility]        26 y.o. female with h/o MS followed at [facility] having chronic left sided neck pain.     Features of cervical facet pain localized to C2-3, C3-4, C4-5 levels.     Failed conservative treatment with meds/ PT. Continues HEP regularly.     Previous Exam findings shows: Left sided facet tenderness and + facet loading, patchy sensory changes over the left UE and trunk. S/P X 2 cervical facet MBNB C2,3,4,& 5 under fluoroscopy with > 90 % pain relief for few days. Now has recurrence of similar pain- predominantly axial in nature.     Plan   Left cervical facet MB radiofrequency ablation at C2, C3, C4 & C5 MB under fluoroscopy. RBA discussed and the patient agreed to proceed.     We will do the procedure with moderate sedation considering the invasive nature of the procedure and patient is anxious about needles. Detailed discussion about the procedure done      Continue HEP.      If UE radicular pain noted in future, consider SYLVIA.     Follow up with neurology at [facility] reg MS.     Urine screen today: no     Counseling :     Patient encouraged to stay active and to watch/lose weight     Encouraged to continue Regular home exercise program as tolerated - stretching / strengthening.     Treatment plan discussed with the patient including medication and procedure side effects.     Controlled Substances Monitoring:      OARRS reviewed- medical marijuana noted. We discussed with the patient that combining pain meds, benzodiazepines, alcohol, illicit drugs or sleep aids increases the risk of respiratory depression including death. We discussed that these medications may cause drowsiness, sedation or dizziness and have counseled the patient not to drive or operate machinery. We have discussed that these medications will be prescribed only by one provider. We have discussed with the patient about age related risk factors and have thoroughly discussed the importance of taking these medications as prescribed. The patient verbalizes understanding. Patient advised regarding steps to help prevent the spread of COVID-19   SOURCE - https://connolly-carreon.info/. html     1-Stay home except to get medical care  2-Clean your hands often for atleast 20 seconds, avoid touching: Avoid touching your eyes, nose, and mouth with unwashed hands. 3-Seek medical attention: Seek prompt medical attention if your illness is worsening (e.g., difficulty breathing). Call you doctor first.  3-Wear a facemask if you are sick   4-Cover your coughs and sneezes           I affirm this is a Patient Initiated Episode with an established Patient who has not had a related appointment within my department in the past 7 days or scheduled within the next 24 hours. Total Time: > 15 mins including the time taken for counseling/ coordination of care and documentation.     Liz Escobar MD    CC:  TAMIKA Foster - CNP

## 2020-10-21 NOTE — PROGRESS NOTES
Jason Estes was read the following message We want to confirm that, for purposes of billing, this is a virtual visit with your provider for which we will submit a claim for reimbursement with your insurance company. You will be responsible for any copays, coinsurance amounts or other amounts not covered by your insurance company. If you do not accept this, unfortunately we will not be able to schedule a virtual visit with the provider. Do you accept? Zoe responded Yes .

## 2020-11-23 ENCOUNTER — APPOINTMENT (OUTPATIENT)
Dept: GENERAL RADIOLOGY | Age: 30
End: 2020-11-23
Payer: COMMERCIAL

## 2020-11-23 ENCOUNTER — HOSPITAL ENCOUNTER (EMERGENCY)
Age: 30
Discharge: HOME | End: 2020-11-23
Payer: COMMERCIAL

## 2020-11-23 VITALS
SYSTOLIC BLOOD PRESSURE: 119 MMHG | TEMPERATURE: 98.2 F | HEIGHT: 61 IN | HEART RATE: 72 BPM | RESPIRATION RATE: 14 BRPM | BODY MASS INDEX: 24.55 KG/M2 | DIASTOLIC BLOOD PRESSURE: 84 MMHG | OXYGEN SATURATION: 99 % | WEIGHT: 130 LBS

## 2020-11-23 PROCEDURE — 29515 APPLICATION SHORT LEG SPLINT: CPT

## 2020-11-23 PROCEDURE — 99213 OFFICE O/P EST LOW 20 MIN: CPT

## 2020-11-23 PROCEDURE — 73630 X-RAY EXAM OF FOOT: CPT

## 2020-11-23 ASSESSMENT — PAIN DESCRIPTION - LOCATION: LOCATION: FOOT

## 2020-11-23 ASSESSMENT — PAIN SCALES - GENERAL: PAINLEVEL_OUTOF10: 8

## 2020-11-23 ASSESSMENT — PAIN DESCRIPTION - ORIENTATION: ORIENTATION: RIGHT

## 2020-11-23 NOTE — ED PROVIDER NOTES
Department of Emergency Medicine   [facility]  Provider Note  Admit Date/RoomTime: 11/23/2020  1:01 PM  Room: 03/03  Chief Complaint   Foot Injury (pt injured right foot at home yesterday. pt has bruising and swelling)    History of Present Illness   Source of history provided by:  patient. History/Exam Limitations: none. Cheeynne Carrillo is a 26 y.o. old female who has a past medical history of:   Past Medical History:   Diagnosis Date    Headache     Multiple sclerosis ([address]     Neck pain     Osteoarthritis     presents to the urgent care center by private vehicle, for her to her right foot. She said she was going up the stairs yesterday and tripped and fell and landed on her foot and that somehow twisted under her. She said something was sticking out like a bone was out of place and she pushed on it and it popped back in it. She said it was extremely painful. She is  still having bruising, pain, and   swelling on the top of her foot She just  want to make sure that there was nothing broken so she came in for evaluation. ROS    Pertinent positives and negatives are stated within HPI, all other systems reviewed and are negative. Past Surgical History:   Procedure Laterality Date    ANESTHESIA NERVE BLOCK Left 8/20/2020    LEFT CERVICAL MEDIAL BRANCH NERVE BLOCK UNDER FLUOROSCOPIC GUIDANCE AT C2,C3,C4 AND C5 (CPT 00001, 34906) performed by Debbe Lennox, MD at [place] [address]      EYE SURGERY      Lasik bilat     LITHOTRIPSY      NERVE BLOCK Left 10/1/2020    #2 LEFT CERVICAL MEDIAL BRANCH NERVE BLOCK UNDER FLUOROSCOPIC GUIDANCE AT C2, C3, C4 AND C5     CPT: 19496 02610 performed by Debbe Lennox, MD at [facility] OR   Social History:  reports that she quit smoking about 4 years ago. Her smoking use included cigarettes.  She has never used smokeless tobacco. She reports that she does not drink alcohol or use drugs. Family History: family history includes No Known Problems in her father and mother. Allergies: Patient has no known allergies. Physical Exam           ED Triage Vitals [11/23/20 1302]   BP Temp Temp Source Pulse Resp SpO2 Height Weight   119/84 98.2 °F (36.8 °C) Infrared 72 14 99 % 5' 1\" (1.549 m) 130 lb (59 kg)      Oxygen Saturation Interpretation: Normal.    · Constitutional:  Alert, development consistent with age. · HEENT:  NC/NT. Airway patent. · Neck:  Normal ROM. Supple. · Extremity(s):  Right: foot. Tenderness:  mild. Swelling: Moderate. Calf:  No evidence of DVT seen on physical exam.. Deformity: No.                 ROM: full range of motion. Skin:  Ecchymosis over the dorsum of the foot. Lymphatics: No lymphangitis or adenopathy noted. · Neurological:  Oriented x3. Motor functions intact. Lab / Imaging Results   (All laboratory and radiology results have been personally reviewed by myself)  Labs:  No results found for this visit on 11/23/20. Imaging: All Radiology results interpreted by Radiologist unless otherwise noted. XR FOOT RIGHT (MIN 3 VIEWS)   Final Result   Oblique fracture of 5th metatarsal with focal 7 mm fracture fragment. ED Course / Medical Decision Making   Medications - No data to display     Consults:   None      MDM:       She does have an oblique fracture of the fifth metatarsal.  She was placed in a posterior splint and was given  crutches she should do no weightbearing, she follows up with orthopedics. She wishes to follow-up with a  facility call physician orthopedics she will follow-up with them soon as possible.     If She develops any numbness, tingling, or discoloration of the toes she should go to the ED  Counseling:   I have  spoken with the patient and discussed todays results, in addition to providing specific details for the plan of care and counseling regarding the diagnosis and prognosis. Questions are answered at this time and they are agreeable with the plan. Assessment      1. Closed displaced fracture of fifth metatarsal bone, unspecified laterality, initial encounter      Plan   Discharge to home and advised to contact Jo-Ann Lawton MD   Kim Alvarez [facility]  [phone]      As needed    Follow up with [facility] as soon as possible         Patient condition is good    New Medications     New Prescriptions    No medications on file     Electronically signed by TAMIKA James CNP   DD: 11/23/20  **This report was transcribed using voice recognition software. Every effort was made to ensure accuracy; however, inadvertent computerized transcription errors may be present.   END OF ED PROVIDER NOTE     TAMIKA James CNP  11/23/20 1400 Statement Selected

## 2020-12-04 ENCOUNTER — TELEPHONE (OUTPATIENT)
Dept: PAIN MANAGEMENT | Age: 30
End: 2020-12-04

## 2020-12-04 NOTE — TELEPHONE ENCOUNTER
Late note  10-30-20-received a call from [facility] that she wants to cancel her 11-5-20 procedure with Dr Gerry Lester. She states she does not want the procedure at this time.   Rodrigo Almeida RN  Pain Management

## 2023-02-07 PROBLEM — G35 MULTIPLE SCLEROSIS (MULTI): Status: ACTIVE | Noted: 2023-02-07

## 2023-02-07 PROBLEM — E78.1 HYPERTRIGLYCERIDEMIA: Status: ACTIVE | Noted: 2023-02-07

## 2023-02-07 PROBLEM — G62.9 PERIPHERAL NEUROPATHY: Status: ACTIVE | Noted: 2023-02-07

## 2023-02-07 PROBLEM — G43.709 CHRONIC MIGRAINE WITHOUT AURA WITHOUT STATUS MIGRAINOSUS, NOT INTRACTABLE: Status: ACTIVE | Noted: 2023-02-07

## 2023-02-07 PROBLEM — R25.2 SPASTICITY: Status: ACTIVE | Noted: 2023-02-07

## 2023-02-07 PROBLEM — F32.1 MODERATE MAJOR DEPRESSION (MULTI): Status: ACTIVE | Noted: 2023-02-07

## 2023-02-07 PROBLEM — F41.9 ANXIETY: Status: ACTIVE | Noted: 2023-02-07

## 2023-02-07 PROBLEM — R53.82 CHRONIC FATIGUE: Status: ACTIVE | Noted: 2023-02-07

## 2023-04-04 ENCOUNTER — OFFICE VISIT (OUTPATIENT)
Dept: PRIMARY CARE | Facility: CLINIC | Age: 33
End: 2023-04-04
Payer: COMMERCIAL

## 2023-04-04 VITALS
BODY MASS INDEX: 19.83 KG/M2 | SYSTOLIC BLOOD PRESSURE: 118 MMHG | WEIGHT: 105 LBS | HEART RATE: 82 BPM | OXYGEN SATURATION: 98 % | TEMPERATURE: 98.1 F | DIASTOLIC BLOOD PRESSURE: 68 MMHG | HEIGHT: 61 IN

## 2023-04-04 DIAGNOSIS — F41.9 ANXIETY: ICD-10-CM

## 2023-04-04 DIAGNOSIS — F32.1 MODERATE MAJOR DEPRESSION (MULTI): Primary | ICD-10-CM

## 2023-04-04 DIAGNOSIS — G35 MULTIPLE SCLEROSIS (MULTI): ICD-10-CM

## 2023-04-04 DIAGNOSIS — D64.9 ANEMIA, UNSPECIFIED TYPE: ICD-10-CM

## 2023-04-04 DIAGNOSIS — M54.2 NECK PAIN: ICD-10-CM

## 2023-04-04 DIAGNOSIS — G63 POLYNEUROPATHY ASSOCIATED WITH UNDERLYING DISEASE (MULTI): ICD-10-CM

## 2023-04-04 DIAGNOSIS — G43.709 CHRONIC MIGRAINE WITHOUT AURA WITHOUT STATUS MIGRAINOSUS, NOT INTRACTABLE: ICD-10-CM

## 2023-04-04 LAB
ALANINE AMINOTRANSFERASE (SGPT) (U/L) IN SER/PLAS: 11 U/L (ref 7–45)
ALBUMIN (G/DL) IN SER/PLAS: 4.3 G/DL (ref 3.4–5)
ALKALINE PHOSPHATASE (U/L) IN SER/PLAS: 46 U/L (ref 33–110)
ANION GAP IN SER/PLAS: 10 MMOL/L (ref 10–20)
ASPARTATE AMINOTRANSFERASE (SGOT) (U/L) IN SER/PLAS: 17 U/L (ref 9–39)
BASOPHILS (10*3/UL) IN BLOOD BY AUTOMATED COUNT: 0.1 X10E9/L (ref 0–0.1)
BASOPHILS/100 LEUKOCYTES IN BLOOD BY AUTOMATED COUNT: 2 % (ref 0–2)
BILIRUBIN TOTAL (MG/DL) IN SER/PLAS: 0.6 MG/DL (ref 0–1.2)
CALCIDIOL (25 OH VITAMIN D3) (NG/ML) IN SER/PLAS: 67 NG/ML
CALCIUM (MG/DL) IN SER/PLAS: 9 MG/DL (ref 8.6–10.3)
CARBON DIOXIDE, TOTAL (MMOL/L) IN SER/PLAS: 29 MMOL/L (ref 21–32)
CHLORIDE (MMOL/L) IN SER/PLAS: 106 MMOL/L (ref 98–107)
CHOLESTEROL (MG/DL) IN SER/PLAS: 167 MG/DL (ref 0–199)
CHOLESTEROL IN HDL (MG/DL) IN SER/PLAS: 57.6 MG/DL
CHOLESTEROL/HDL RATIO: 2.9
CREATININE (MG/DL) IN SER/PLAS: 0.79 MG/DL (ref 0.5–1.05)
EOSINOPHILS (10*3/UL) IN BLOOD BY AUTOMATED COUNT: 0.21 X10E9/L (ref 0–0.7)
EOSINOPHILS/100 LEUKOCYTES IN BLOOD BY AUTOMATED COUNT: 4.2 % (ref 0–6)
ERYTHROCYTE DISTRIBUTION WIDTH (RATIO) BY AUTOMATED COUNT: 13.8 % (ref 11.5–14.5)
ERYTHROCYTE MEAN CORPUSCULAR HEMOGLOBIN CONCENTRATION (G/DL) BY AUTOMATED: 32.1 G/DL (ref 32–36)
ERYTHROCYTE MEAN CORPUSCULAR VOLUME (FL) BY AUTOMATED COUNT: 89 FL (ref 80–100)
ERYTHROCYTES (10*6/UL) IN BLOOD BY AUTOMATED COUNT: 3.99 X10E12/L (ref 4–5.2)
GFR FEMALE: >90 ML/MIN/1.73M2
GLUCOSE (MG/DL) IN SER/PLAS: 78 MG/DL (ref 74–99)
HEMATOCRIT (%) IN BLOOD BY AUTOMATED COUNT: 35.5 % (ref 36–46)
HEMOGLOBIN (G/DL) IN BLOOD: 11.4 G/DL (ref 12–16)
IMMATURE GRANULOCYTES/100 LEUKOCYTES IN BLOOD BY AUTOMATED COUNT: 0.2 % (ref 0–0.9)
LDL: 90 MG/DL (ref 0–99)
LEUKOCYTES (10*3/UL) IN BLOOD BY AUTOMATED COUNT: 5 X10E9/L (ref 4.4–11.3)
LYMPHOCYTES (10*3/UL) IN BLOOD BY AUTOMATED COUNT: 1.88 X10E9/L (ref 1.2–4.8)
LYMPHOCYTES/100 LEUKOCYTES IN BLOOD BY AUTOMATED COUNT: 37.9 % (ref 13–44)
MONOCYTES (10*3/UL) IN BLOOD BY AUTOMATED COUNT: 0.76 X10E9/L (ref 0.1–1)
MONOCYTES/100 LEUKOCYTES IN BLOOD BY AUTOMATED COUNT: 15.3 % (ref 2–10)
NEUTROPHILS (10*3/UL) IN BLOOD BY AUTOMATED COUNT: 2 X10E9/L (ref 1.2–7.7)
NEUTROPHILS/100 LEUKOCYTES IN BLOOD BY AUTOMATED COUNT: 40.4 % (ref 40–80)
PLATELETS (10*3/UL) IN BLOOD AUTOMATED COUNT: 321 X10E9/L (ref 150–450)
POTASSIUM (MMOL/L) IN SER/PLAS: 4.1 MMOL/L (ref 3.5–5.3)
PROTEIN TOTAL: 6.5 G/DL (ref 6.4–8.2)
SODIUM (MMOL/L) IN SER/PLAS: 141 MMOL/L (ref 136–145)
THYROTROPIN (MIU/L) IN SER/PLAS BY DETECTION LIMIT <= 0.05 MIU/L: 1.13 MIU/L (ref 0.44–3.98)
TRIGLYCERIDE (MG/DL) IN SER/PLAS: 95 MG/DL (ref 0–149)
UREA NITROGEN (MG/DL) IN SER/PLAS: 14 MG/DL (ref 6–23)
VLDL: 19 MG/DL (ref 0–40)

## 2023-04-04 PROCEDURE — 99214 OFFICE O/P EST MOD 30 MIN: CPT | Performed by: FAMILY MEDICINE

## 2023-04-04 PROCEDURE — 1036F TOBACCO NON-USER: CPT | Performed by: FAMILY MEDICINE

## 2023-04-04 RX ORDER — GABAPENTIN 300 MG/1
300 CAPSULE ORAL 3 TIMES DAILY
Qty: 90 CAPSULE | Refills: 5 | Status: SHIPPED | OUTPATIENT
Start: 2023-04-04 | End: 2023-10-26 | Stop reason: SDUPTHER

## 2023-04-04 RX ORDER — ELETRIPTAN HYDROBROMIDE 40 MG/1
40 TABLET, FILM COATED ORAL ONCE AS NEEDED
Qty: 6 TABLET
Start: 2023-04-04

## 2023-04-04 RX ORDER — CLONIDINE HYDROCHLORIDE 0.1 MG/1
0.1 TABLET ORAL DAILY
COMMUNITY
End: 2023-04-04 | Stop reason: SDUPTHER

## 2023-04-04 RX ORDER — HYDROXYZINE HYDROCHLORIDE 25 MG/1
25 TABLET, FILM COATED ORAL EVERY 8 HOURS PRN
Start: 2023-04-04 | End: 2024-05-15 | Stop reason: ALTCHOICE

## 2023-04-04 RX ORDER — GABAPENTIN 300 MG/1
300 CAPSULE ORAL 3 TIMES DAILY
Start: 2023-04-04 | End: 2023-04-04 | Stop reason: SDUPTHER

## 2023-04-04 RX ORDER — DULOXETIN HYDROCHLORIDE 60 MG/1
60 CAPSULE, DELAYED RELEASE ORAL 2 TIMES DAILY
Start: 2023-04-04

## 2023-04-04 RX ORDER — MELOXICAM 15 MG/1
15 TABLET ORAL DAILY
Qty: 30 TABLET | Refills: 11 | Status: SHIPPED | OUTPATIENT
Start: 2023-04-04 | End: 2023-11-13 | Stop reason: WASHOUT

## 2023-04-04 RX ORDER — CLONIDINE HYDROCHLORIDE 0.1 MG/1
0.1 TABLET ORAL DAILY
Start: 2023-04-04

## 2023-04-04 RX ORDER — DEXTROAMPHETAMINE SACCHARATE, AMPHETAMINE ASPARTATE MONOHYDRATE, DEXTROAMPHETAMINE SULFATE AND AMPHETAMINE SULFATE 5; 5; 5; 5 MG/1; MG/1; MG/1; MG/1
20 CAPSULE, EXTENDED RELEASE ORAL DAILY
Start: 2023-04-04 | End: 2024-05-15 | Stop reason: DRUGHIGH

## 2023-04-04 ASSESSMENT — ENCOUNTER SYMPTOMS
NERVOUS/ANXIOUS: 0
WHEEZING: 0
DYSPHORIC MOOD: 0
DIARRHEA: 0
DIAPHORESIS: 0
HEMATURIA: 0
ABDOMINAL PAIN: 0
BACK PAIN: 1
ARTHRALGIAS: 1
COUGH: 0
CHEST TIGHTNESS: 0
SINUS PAIN: 0
NAUSEA: 0
POLYPHAGIA: 0
UNEXPECTED WEIGHT CHANGE: 0
ADENOPATHY: 0
VOMITING: 0
CONSTIPATION: 0
POLYDIPSIA: 0
LIGHT-HEADEDNESS: 0
HEADACHES: 0
SINUS PRESSURE: 0
SHORTNESS OF BREATH: 0
CHILLS: 0
FEVER: 0
PALPITATIONS: 0
SORE THROAT: 0
CONFUSION: 0
NUMBNESS: 0
DIZZINESS: 0
FREQUENCY: 0
DYSURIA: 0

## 2023-04-04 ASSESSMENT — PATIENT HEALTH QUESTIONNAIRE - PHQ9
SUM OF ALL RESPONSES TO PHQ9 QUESTIONS 1 AND 2: 0
2. FEELING DOWN, DEPRESSED OR HOPELESS: NOT AT ALL
1. LITTLE INTEREST OR PLEASURE IN DOING THINGS: NOT AT ALL

## 2023-04-04 NOTE — PROGRESS NOTES
"Subjective   Patient ID: Do Alex is a 32 y.o. female who presents for Follow-up.    [facility]   routine follow up. chronic issues as per assessment and plan.     Blood work done today. Results pending. CBC is back. Shows hemoglobin low at 11.4. She did recently finish her menses.     Having issues with neck pain. This has been going on for 2 months. When she turns, feels it grinding. Denies injury.  Denies any numbness or tingling hands.     Review of Systems   Constitutional:  Negative for chills, diaphoresis, fever and unexpected weight change.   HENT:  Negative for congestion, sinus pressure, sinus pain, sneezing and sore throat.    Respiratory:  Negative for cough, chest tightness, shortness of breath and wheezing.    Cardiovascular:  Negative for chest pain, palpitations and leg swelling.   Gastrointestinal:  Negative for abdominal pain, constipation, diarrhea, nausea and vomiting.   Endocrine: Negative for cold intolerance, heat intolerance, polydipsia, polyphagia and polyuria.   Genitourinary:  Negative for dysuria, frequency, hematuria and urgency.   Musculoskeletal:  Positive for arthralgias and back pain.   Neurological:  Negative for dizziness, syncope, light-headedness, numbness and headaches.   Hematological:  Negative for adenopathy.   Psychiatric/Behavioral:  Negative for confusion and dysphoric mood. The patient is not nervous/anxious.        Objective   /68   Pulse 82   Temp 36.7 °C (98.1 °F)   Ht 1.549 m (5' 1\")   Wt 47.6 kg (105 lb)   SpO2 98%   BMI 19.84 kg/m²     Physical Exam  Vitals and nursing note reviewed.   Constitutional:       General: She is not in acute distress.     Appearance: Normal appearance.   HENT:      Head: Normocephalic and atraumatic.      Nose: Nose normal.   Eyes:      Extraocular Movements: Extraocular movements intact.      Conjunctiva/sclera: Conjunctivae normal.      Pupils: Pupils are equal, round, and reactive to light.   Cardiovascular:      Rate and " Rhythm: Normal rate and regular rhythm.      Heart sounds: No murmur heard.     No friction rub. No gallop.   Pulmonary:      Effort: Pulmonary effort is normal.      Breath sounds: Normal breath sounds. No wheezing, rhonchi or rales.   Abdominal:      General: Bowel sounds are normal. There is no distension.      Palpations: Abdomen is soft.      Tenderness: There is no abdominal tenderness.   Musculoskeletal:         General: Normal range of motion.      Cervical back: Normal range of motion and neck supple.   Skin:     General: Skin is warm and dry.   Neurological:      General: No focal deficit present.      Mental Status: She is alert and oriented to person, place, and time.      Deep Tendon Reflexes: Reflexes normal.   Psychiatric:         Mood and Affect: Mood normal.         Behavior: Behavior normal.         Thought Content: Thought content normal.         Judgment: Judgment normal.         Assessment/Plan   Problem List Items Addressed This Visit       Anemia     - recent hemoglobin low at 11.4. did just finish her menses  - recheck CBC and iron studies in 2 weeks          Relevant Orders    CBC and Auto Differential    Ferritin    Iron and TIBC    Follow Up In Primary Care    Anxiety    Relevant Medications    DULoxetine (Cymbalta) 60 mg DR capsule    hydrOXYzine HCL (Atarax) 25 mg tablet    Other Relevant Orders    Follow Up In Primary Care    Chronic migraine without aura without status migrainosus, not intractable    Relevant Medications    eletriptan (Relpax) 40 mg tablet    Other Relevant Orders    Follow Up In Primary Care    Moderate major depression (CMS/HCC) - Primary    Relevant Medications    DULoxetine (Cymbalta) 60 mg DR capsule    cloNIDine (Catapres) 0.1 mg tablet    Other Relevant Orders    Follow Up In Primary Care    Multiple sclerosis (CMS/HCC)     follows with neurology         Relevant Medications    amphetamine-dextroamphetamine XR (Adderall XR) 20 mg 24 hr capsule    cloNIDine  (Catapres) 0.1 mg tablet    Other Relevant Orders    Follow Up In Primary Care    Neck pain     - check x-ray   - refer to physical therapy   - start meloxicam          Relevant Medications    meloxicam (Mobic) 15 mg tablet    Other Relevant Orders    XR cervical spine complete 4-5 views    Referral to Physical Therapy    Vitamin B12    Follow Up In Primary Care    Follow Up In Primary Care    Peripheral neuropathy    Relevant Medications    gabapentin (Neurontin) 300 mg capsule    Other Relevant Orders    Follow Up In Primary Care

## 2023-04-04 NOTE — PATIENT INSTRUCTIONS
Do Alex ,    Thank you for coming in today. We at [facility] appreciate your trust in our care. If you have any questions or concerns about the care you received today, please do not hesitate to contact us at [phone].    The following instructions were discussed today:    - get neck x-ray   - refer to physical therapy   - start meloxicam 15 mg daily x 2 weeks then as needed.  Take with food and avoid other over the counter pain relievers with the exception of Tylenol (acetaminophen), which is safe to take with this. Stop the medication if it upsets your stomach.

## 2023-04-04 NOTE — ASSESSMENT & PLAN NOTE
- recent hemoglobin low at 11.4. did just finish her menses  - recheck CBC and iron studies in 2 weeks

## 2023-05-03 ENCOUNTER — LAB (OUTPATIENT)
Dept: LAB | Facility: LAB | Age: 33
End: 2023-05-03
Payer: COMMERCIAL

## 2023-05-03 DIAGNOSIS — M54.2 NECK PAIN: ICD-10-CM

## 2023-05-03 DIAGNOSIS — D64.9 ANEMIA, UNSPECIFIED TYPE: ICD-10-CM

## 2023-05-03 LAB
BASOPHILS (10*3/UL) IN BLOOD BY AUTOMATED COUNT: 0.06 X10E9/L (ref 0–0.1)
BASOPHILS/100 LEUKOCYTES IN BLOOD BY AUTOMATED COUNT: 0.7 % (ref 0–2)
COBALAMIN (VITAMIN B12) (PG/ML) IN SER/PLAS: 429 PG/ML (ref 211–911)
EOSINOPHILS (10*3/UL) IN BLOOD BY AUTOMATED COUNT: 0.22 X10E9/L (ref 0–0.7)
EOSINOPHILS/100 LEUKOCYTES IN BLOOD BY AUTOMATED COUNT: 2.5 % (ref 0–6)
ERYTHROCYTE DISTRIBUTION WIDTH (RATIO) BY AUTOMATED COUNT: 13.9 % (ref 11.5–14.5)
ERYTHROCYTE MEAN CORPUSCULAR HEMOGLOBIN CONCENTRATION (G/DL) BY AUTOMATED: 32.5 G/DL (ref 32–36)
ERYTHROCYTE MEAN CORPUSCULAR VOLUME (FL) BY AUTOMATED COUNT: 88 FL (ref 80–100)
ERYTHROCYTES (10*6/UL) IN BLOOD BY AUTOMATED COUNT: 4.04 X10E12/L (ref 4–5.2)
FERRITIN (UG/LL) IN SER/PLAS: 14 UG/L (ref 8–150)
HEMATOCRIT (%) IN BLOOD BY AUTOMATED COUNT: 35.7 % (ref 36–46)
HEMOGLOBIN (G/DL) IN BLOOD: 11.6 G/DL (ref 12–16)
IMMATURE GRANULOCYTES/100 LEUKOCYTES IN BLOOD BY AUTOMATED COUNT: 0.2 % (ref 0–0.9)
IRON (UG/DL) IN SER/PLAS: 67 UG/DL (ref 35–150)
IRON BINDING CAPACITY (UG/DL) IN SER/PLAS: 389 UG/DL (ref 240–445)
IRON SATURATION (%) IN SER/PLAS: 17 % (ref 25–45)
LEUKOCYTES (10*3/UL) IN BLOOD BY AUTOMATED COUNT: 8.7 X10E9/L (ref 4.4–11.3)
LYMPHOCYTES (10*3/UL) IN BLOOD BY AUTOMATED COUNT: 1.59 X10E9/L (ref 1.2–4.8)
LYMPHOCYTES/100 LEUKOCYTES IN BLOOD BY AUTOMATED COUNT: 18.3 % (ref 13–44)
MONOCYTES (10*3/UL) IN BLOOD BY AUTOMATED COUNT: 0.72 X10E9/L (ref 0.1–1)
MONOCYTES/100 LEUKOCYTES IN BLOOD BY AUTOMATED COUNT: 8.3 % (ref 2–10)
NEUTROPHILS (10*3/UL) IN BLOOD BY AUTOMATED COUNT: 6.06 X10E9/L (ref 1.2–7.7)
NEUTROPHILS/100 LEUKOCYTES IN BLOOD BY AUTOMATED COUNT: 70 % (ref 40–80)
PLATELETS (10*3/UL) IN BLOOD AUTOMATED COUNT: 353 X10E9/L (ref 150–450)

## 2023-05-03 PROCEDURE — 85025 COMPLETE CBC W/AUTO DIFF WBC: CPT

## 2023-05-03 PROCEDURE — 82728 ASSAY OF FERRITIN: CPT

## 2023-05-03 PROCEDURE — 83550 IRON BINDING TEST: CPT

## 2023-05-03 PROCEDURE — 82607 VITAMIN B-12: CPT

## 2023-05-03 PROCEDURE — 83540 ASSAY OF IRON: CPT

## 2023-05-03 PROCEDURE — 36415 COLL VENOUS BLD VENIPUNCTURE: CPT

## 2023-05-04 ENCOUNTER — TELEMEDICINE (OUTPATIENT)
Dept: PRIMARY CARE | Facility: CLINIC | Age: 33
End: 2023-05-04
Payer: COMMERCIAL

## 2023-05-04 DIAGNOSIS — M54.2 NECK PAIN: ICD-10-CM

## 2023-05-04 DIAGNOSIS — D50.0 IRON DEFICIENCY ANEMIA DUE TO CHRONIC BLOOD LOSS: Primary | ICD-10-CM

## 2023-05-04 PROCEDURE — 99213 OFFICE O/P EST LOW 20 MIN: CPT | Performed by: FAMILY MEDICINE

## 2023-05-04 RX ORDER — FERROUS SULFATE 325(65) MG
65 TABLET, DELAYED RELEASE (ENTERIC COATED) ORAL DAILY
Qty: 30 TABLET | Refills: 11 | Status: SHIPPED | OUTPATIENT
Start: 2023-05-04 | End: 2024-05-03

## 2023-05-04 ASSESSMENT — ENCOUNTER SYMPTOMS
ADENOPATHY: 0
SINUS PAIN: 0
CONFUSION: 0
NERVOUS/ANXIOUS: 0
HEADACHES: 0
CHEST TIGHTNESS: 0
COUGH: 0
FREQUENCY: 0
DYSURIA: 0
BACK PAIN: 1
DIARRHEA: 0
UNEXPECTED WEIGHT CHANGE: 0
NUMBNESS: 0
SHORTNESS OF BREATH: 0
LIGHT-HEADEDNESS: 0
SINUS PRESSURE: 0
POLYDIPSIA: 0
DIAPHORESIS: 0
ARTHRALGIAS: 1
WHEEZING: 0
DYSPHORIC MOOD: 0
FEVER: 0
CHILLS: 0
VOMITING: 0
HEMATURIA: 0
SORE THROAT: 0
PALPITATIONS: 0
NAUSEA: 0
POLYPHAGIA: 0
ABDOMINAL PAIN: 0
CONSTIPATION: 0
DIZZINESS: 0

## 2023-05-04 NOTE — ASSESSMENT & PLAN NOTE
- not getting much relief with meloxicam  - plans to start physical therapy after her daughter is done with dance in a few weeks   - will message me about 4 weeks after starting physical therapy to let me know how she is doing

## 2023-05-04 NOTE — PROGRESS NOTES
Subjective   Patient ID: Do Alex is a 33 y.o. female who presents for No chief complaint on file..    Virtual or Telephone Consent    An interactive audio and video telecommunication system which permits real time communications between the patient (at the originating site) and provider (at the distant site) was utilized to provide this telehealth service.   Verbal consent was requested and obtained from Do Alex on this date, 05/04/23 for a telehealth visit.      Follow up on neck pain and anemia. Still with neck pain. Meloxicam did not help much. Has not had chance to do x-ray or physical therapy.         Lab on 05/03/2023   Component Date Value Ref Range Status    WBC 05/03/2023 8.7  4.4 - 11.3 x10E9/L Final    RBC 05/03/2023 4.04  4.00 - 5.20 x10E12/L Final    Hemoglobin 05/03/2023 11.6 (L)  12.0 - 16.0 g/dL Final    Hematocrit 05/03/2023 35.7 (L)  36.0 - 46.0 % Final    MCV 05/03/2023 88  80 - 100 fL Final    MCHC 05/03/2023 32.5  32.0 - 36.0 g/dL Final    Platelets 05/03/2023 353  150 - 450 x10E9/L Final    RDW 05/03/2023 13.9  11.5 - 14.5 % Final    Neutrophils % 05/03/2023 70.0  40.0 - 80.0 % Final    Immature Granulocytes %, Automated 05/03/2023 0.2  0.0 - 0.9 % Final     Immature Granulocyte Count (IG) includes promyelocytes,    myelocytes and metamyelocytes but does not include bands.   Percent differential counts (%) should be interpreted in the   context of the absolute cell counts (cells/L).    Lymphocytes % 05/03/2023 18.3  13.0 - 44.0 % Final    Monocytes % 05/03/2023 8.3  2.0 - 10.0 % Final    Eosinophils % 05/03/2023 2.5  0.0 - 6.0 % Final    Basophils % 05/03/2023 0.7  0.0 - 2.0 % Final    Neutrophils Absolute 05/03/2023 6.06  1.20 - 7.70 x10E9/L Final    Lymphocytes Absolute 05/03/2023 1.59  1.20 - 4.80 x10E9/L Final    Monocytes Absolute 05/03/2023 0.72  0.10 - 1.00 x10E9/L Final    Eosinophils Absolute 05/03/2023 0.22  0.00 - 0.70 x10E9/L Final    Basophils Absolute 05/03/2023  0.06  0.00 - 0.10 x10E9/L Final    Ferritin 05/03/2023 14  8 - 150 ug/L Final    Iron 05/03/2023 67  35 - 150 ug/dL Final    TIBC 05/03/2023 389  240 - 445 ug/dL Final    Iron Saturation 05/03/2023 17 (L)  25 - 45 % Final    Vitamin B-12 05/03/2023 429  211 - 911 pg/mL Final        Review of Systems   Constitutional:  Negative for chills, diaphoresis, fever and unexpected weight change.   HENT:  Negative for congestion, sinus pressure, sinus pain, sneezing and sore throat.    Respiratory:  Negative for cough, chest tightness, shortness of breath and wheezing.    Cardiovascular:  Negative for chest pain, palpitations and leg swelling.   Gastrointestinal:  Negative for abdominal pain, constipation, diarrhea, nausea and vomiting.   Endocrine: Negative for cold intolerance, heat intolerance, polydipsia, polyphagia and polyuria.   Genitourinary:  Negative for dysuria, frequency, hematuria and urgency.   Musculoskeletal:  Positive for arthralgias and back pain.   Neurological:  Negative for dizziness, syncope, light-headedness, numbness and headaches.   Hematological:  Negative for adenopathy.   Psychiatric/Behavioral:  Negative for confusion and dysphoric mood. The patient is not nervous/anxious.            Assessment/Plan   Problem List Items Addressed This Visit       Iron deficiency anemia due to chronic blood loss - Primary     - recent hemoglobin somewhat better at 11.6 (11.4)   - iron studies show low iron  - likely secondary to menses and chronic illness (MS)  - start ferrous sulfate 325 mg daily   - recheck CBC and iron studies in 4-8 weeks          Neck pain     - not getting much relief with meloxicam  - plans to start physical therapy after her daughter is done with dance in a few weeks   - will message me about 4 weeks after starting physical therapy to let me know how she is doing

## 2023-05-04 NOTE — PATIENT INSTRUCTIONS
Do Alex ,    Thank you for coming in today. We at [facility] appreciate your trust in our care. If you have any questions or concerns about the care you received today, please do not hesitate to contact us at [phone].    The following instructions were discussed today:

## 2023-05-04 NOTE — ASSESSMENT & PLAN NOTE
- recent hemoglobin somewhat better at 11.6 (11.4)   - iron studies show low iron  - likely secondary to menses and chronic illness (MS)  - start ferrous sulfate 325 mg daily   - recheck CBC and iron studies in 4-8 weeks

## 2023-10-04 ENCOUNTER — APPOINTMENT (OUTPATIENT)
Dept: PRIMARY CARE | Facility: CLINIC | Age: 33
End: 2023-10-04
Payer: COMMERCIAL

## 2023-10-26 DIAGNOSIS — G63 POLYNEUROPATHY ASSOCIATED WITH UNDERLYING DISEASE (MULTI): ICD-10-CM

## 2023-10-26 RX ORDER — GABAPENTIN 300 MG/1
300 CAPSULE ORAL 3 TIMES DAILY
Qty: 90 CAPSULE | Refills: 5 | Status: SHIPPED | OUTPATIENT
Start: 2023-10-26 | End: 2023-11-13 | Stop reason: SDUPTHER

## 2023-11-08 ENCOUNTER — HOSPITAL ENCOUNTER (EMERGENCY)
Facility: HOSPITAL | Age: 33
Discharge: HOME | End: 2023-11-08
Attending: EMERGENCY MEDICINE
Payer: COMMERCIAL

## 2023-11-08 ENCOUNTER — APPOINTMENT (OUTPATIENT)
Dept: RADIOLOGY | Facility: HOSPITAL | Age: 33
End: 2023-11-08
Payer: COMMERCIAL

## 2023-11-08 VITALS
WEIGHT: 110 LBS | BODY MASS INDEX: 20.77 KG/M2 | TEMPERATURE: 98.6 F | RESPIRATION RATE: 20 BRPM | HEART RATE: 84 BPM | HEIGHT: 61 IN | OXYGEN SATURATION: 99 % | SYSTOLIC BLOOD PRESSURE: 124 MMHG | DIASTOLIC BLOOD PRESSURE: 85 MMHG

## 2023-11-08 DIAGNOSIS — R07.89 XIPHODYNIA: Primary | ICD-10-CM

## 2023-11-08 LAB
ALBUMIN SERPL BCP-MCNC: 4.1 G/DL (ref 3.4–5)
ALP SERPL-CCNC: 32 U/L (ref 33–110)
ALT SERPL W P-5'-P-CCNC: 12 U/L (ref 7–45)
ANION GAP SERPL CALC-SCNC: 9 MMOL/L (ref 10–20)
APPEARANCE UR: ABNORMAL
AST SERPL W P-5'-P-CCNC: 14 U/L (ref 9–39)
BASOPHILS # BLD AUTO: 0.05 X10*3/UL (ref 0–0.1)
BASOPHILS NFR BLD AUTO: 0.7 %
BILIRUB SERPL-MCNC: 0.4 MG/DL (ref 0–1.2)
BILIRUB UR STRIP.AUTO-MCNC: NEGATIVE MG/DL
BUN SERPL-MCNC: 9 MG/DL (ref 6–23)
CALCIUM SERPL-MCNC: 8.8 MG/DL (ref 8.6–10.3)
CHLORIDE SERPL-SCNC: 106 MMOL/L (ref 98–107)
CO2 SERPL-SCNC: 29 MMOL/L (ref 21–32)
COLOR UR: YELLOW
CREAT SERPL-MCNC: 0.73 MG/DL (ref 0.5–1.05)
EOSINOPHIL # BLD AUTO: 0.29 X10*3/UL (ref 0–0.7)
EOSINOPHIL NFR BLD AUTO: 4.1 %
ERYTHROCYTE [DISTWIDTH] IN BLOOD BY AUTOMATED COUNT: 12.2 % (ref 11.5–14.5)
GFR SERPL CREATININE-BSD FRML MDRD: >90 ML/MIN/1.73M*2
GLUCOSE SERPL-MCNC: 87 MG/DL (ref 74–99)
GLUCOSE UR STRIP.AUTO-MCNC: NEGATIVE MG/DL
HCG UR QL IA.RAPID: NEGATIVE
HCT VFR BLD AUTO: 36.6 % (ref 36–46)
HGB BLD-MCNC: 12.5 G/DL (ref 12–16)
IMM GRANULOCYTES # BLD AUTO: 0.01 X10*3/UL (ref 0–0.7)
IMM GRANULOCYTES NFR BLD AUTO: 0.1 % (ref 0–0.9)
KETONES UR STRIP.AUTO-MCNC: NEGATIVE MG/DL
LEUKOCYTE ESTERASE UR QL STRIP.AUTO: NEGATIVE
LIPASE SERPL-CCNC: 43 U/L (ref 9–82)
LYMPHOCYTES # BLD AUTO: 2.03 X10*3/UL (ref 1.2–4.8)
LYMPHOCYTES NFR BLD AUTO: 29 %
MCH RBC QN AUTO: 31.2 PG (ref 26–34)
MCHC RBC AUTO-ENTMCNC: 34.2 G/DL (ref 32–36)
MCV RBC AUTO: 91 FL (ref 80–100)
MONOCYTES # BLD AUTO: 0.75 X10*3/UL (ref 0.1–1)
MONOCYTES NFR BLD AUTO: 10.7 %
NEUTROPHILS # BLD AUTO: 3.86 X10*3/UL (ref 1.2–7.7)
NEUTROPHILS NFR BLD AUTO: 55.4 %
NITRITE UR QL STRIP.AUTO: NEGATIVE
NRBC BLD-RTO: 0 /100 WBCS (ref 0–0)
PH UR STRIP.AUTO: 6 [PH]
PLATELET # BLD AUTO: 286 X10*3/UL (ref 150–450)
POTASSIUM SERPL-SCNC: 3.6 MMOL/L (ref 3.5–5.3)
PROT SERPL-MCNC: 6.1 G/DL (ref 6.4–8.2)
PROT UR STRIP.AUTO-MCNC: NEGATIVE MG/DL
RBC # BLD AUTO: 4.01 X10*6/UL (ref 4–5.2)
RBC # UR STRIP.AUTO: NEGATIVE /UL
SODIUM SERPL-SCNC: 140 MMOL/L (ref 136–145)
SP GR UR STRIP.AUTO: 1.02
UROBILINOGEN UR STRIP.AUTO-MCNC: <2 MG/DL
WBC # BLD AUTO: 7 X10*3/UL (ref 4.4–11.3)

## 2023-11-08 PROCEDURE — 71045 X-RAY EXAM CHEST 1 VIEW: CPT

## 2023-11-08 PROCEDURE — 83690 ASSAY OF LIPASE: CPT | Performed by: EMERGENCY MEDICINE

## 2023-11-08 PROCEDURE — 71045 X-RAY EXAM CHEST 1 VIEW: CPT | Performed by: RADIOLOGY

## 2023-11-08 PROCEDURE — 81003 URINALYSIS AUTO W/O SCOPE: CPT | Performed by: EMERGENCY MEDICINE

## 2023-11-08 PROCEDURE — 85025 COMPLETE CBC W/AUTO DIFF WBC: CPT | Performed by: EMERGENCY MEDICINE

## 2023-11-08 PROCEDURE — 2500000004 HC RX 250 GENERAL PHARMACY W/ HCPCS (ALT 636 FOR OP/ED): Performed by: EMERGENCY MEDICINE

## 2023-11-08 PROCEDURE — 99283 EMERGENCY DEPT VISIT LOW MDM: CPT | Mod: 25

## 2023-11-08 PROCEDURE — 81025 URINE PREGNANCY TEST: CPT | Performed by: EMERGENCY MEDICINE

## 2023-11-08 PROCEDURE — 96374 THER/PROPH/DIAG INJ IV PUSH: CPT

## 2023-11-08 PROCEDURE — 99284 EMERGENCY DEPT VISIT MOD MDM: CPT | Mod: 25

## 2023-11-08 PROCEDURE — 36415 COLL VENOUS BLD VENIPUNCTURE: CPT | Performed by: EMERGENCY MEDICINE

## 2023-11-08 PROCEDURE — 84075 ASSAY ALKALINE PHOSPHATASE: CPT | Performed by: EMERGENCY MEDICINE

## 2023-11-08 PROCEDURE — 99285 EMERGENCY DEPT VISIT HI MDM: CPT | Mod: 25 | Performed by: EMERGENCY MEDICINE

## 2023-11-08 RX ORDER — NAPROXEN 500 MG/1
500 TABLET ORAL
Qty: 30 TABLET | Refills: 0 | Status: SHIPPED | OUTPATIENT
Start: 2023-11-08 | End: 2023-11-13 | Stop reason: WASHOUT

## 2023-11-08 RX ORDER — KETOROLAC TROMETHAMINE 30 MG/ML
15 INJECTION, SOLUTION INTRAMUSCULAR; INTRAVENOUS ONCE
Status: COMPLETED | OUTPATIENT
Start: 2023-11-08 | End: 2023-11-08

## 2023-11-08 RX ADMIN — KETOROLAC TROMETHAMINE 15 MG: 30 INJECTION, SOLUTION INTRAMUSCULAR; INTRAVENOUS at 06:25

## 2023-11-08 ASSESSMENT — PAIN - FUNCTIONAL ASSESSMENT: PAIN_FUNCTIONAL_ASSESSMENT: 0-10

## 2023-11-08 ASSESSMENT — PAIN SCALES - GENERAL: PAINLEVEL_OUTOF10: 8

## 2023-11-12 NOTE — ED PROVIDER NOTES
HPI   Chief Complaint   Patient presents with    Abdominal Pain     Pt c/o upper abdominal pain x 1 month which is gradually worsening, moderate diarrhea as well. Tonight the pain prevented sleep. Pt has been taking OTC reflux meds w/ little relief. 1 episode of emesis last night. Hx of appendectomy and ectopic pregnancy years prior. Hx of MS and migraines.        Chief complaint: Chest pain    History of present illness: Patient is a 33-year-old female with history of MS presenting to the emergency department with complaints of chest pain.  According to the patient, her symptoms been ongoing for the past month..  Patient states that the pain is an aching in the center of her chest.  The patient states that the pain has been getting gradually worse.  Patient states that she had 1 episode of vomiting last night.  Patient denies any diaphoresis with this.  She denies any shortness of breath.  She denies having symptoms like this in the past.  Concerned that her symptoms are not improving, the patient presents to the emergency department for further evaluation she has concerns that this could be her gallbladder.        History provided by:  Patient   used: No                        No data recorded                Patient History   Past Medical History:   Diagnosis Date    Carpal tunnel syndrome, bilateral upper limbs 09/25/2019    Bilateral carpal tunnel syndrome    Displaced fracture of fifth metatarsal bone, left foot, subsequent encounter for fracture with routine healing 11/24/2020    Closed displaced fracture of fifth metatarsal bone of left foot with routine healing, subsequent encounter    Personal history of other diseases of the respiratory system 03/30/2022    History of acute sinusitis    Personal history of other specified conditions 11/19/2020    History of dysuria    Personal history of urinary (tract) infections 11/24/2020    History of urinary tract infection    Personal history of  urinary calculi     History of kidney stones    Postconcussional syndrome 2019    Post-concussion syndrome    Strain of muscle, fascia and tendon at neck level, initial encounter 2019    Strain of neck muscle, initial encounter     Past Surgical History:   Procedure Laterality Date    OTHER SURGICAL HISTORY  2019    Carpal tunnel surgery    OTHER SURGICAL HISTORY  2019    Appendectomy    OTHER SURGICAL HISTORY  2020    Ectopic pregnancy removal with salpingectomy    OTHER SURGICAL HISTORY  2020    Foot surgery     No family history on file.  Social History     Tobacco Use    Smoking status: Former     Types: Cigarettes     Start date:      Quit date: 2017     Years since quittin.8    Smokeless tobacco: Never   Vaping Use    Vaping Use: Never used   Substance Use Topics    Alcohol use: Not Currently    Drug use: Not Currently       Physical Exam   ED Triage Vitals [23 0436]   Temp Heart Rate Resp BP   37 °C (98.6 °F) 84 20 124/85      SpO2 Temp src Heart Rate Source Patient Position   99 % -- -- --      BP Location FiO2 (%)     -- --       Physical Exam  Vitals and nursing note reviewed.   Constitutional:       General: She is not in acute distress.     Appearance: She is well-developed.   HENT:      Head: Normocephalic and atraumatic.   Eyes:      Conjunctiva/sclera: Conjunctivae normal.   Cardiovascular:      Rate and Rhythm: Normal rate and regular rhythm.      Heart sounds: No murmur heard.     Comments: Pt has tenderness to palpation of the xiphoid process.  Pulmonary:      Effort: Pulmonary effort is normal. No respiratory distress.      Breath sounds: Normal breath sounds.   Abdominal:      Palpations: Abdomen is soft.      Tenderness: There is no abdominal tenderness.   Musculoskeletal:         General: No swelling.      Cervical back: Neck supple.   Skin:     General: Skin is warm and dry.      Capillary Refill: Capillary refill takes less than 2 seconds.    Neurological:      Mental Status: She is alert.   Psychiatric:         Mood and Affect: Mood normal.         ED Course & MDM   Diagnoses as of 11/12/23 1428   Xiphodynia       Medical Decision Making  Medical decision making: Patient remained stable throughout her time in the emergency department.  CBC demonstrated no significant abnormalities Chem-7 and LFTs were all within normal limits.  Urinalysis demonstrated no significant abnormality of abnormalities beta-hCG was normal.  Lipase was normal.  X-ray the patient's chest demonstrated no significant acute abnormalities.    Patient presents to the emergency department with complaints of chest pain.  Work-up was performed as above and demonstrated no significant acute abnormalities.  The patient was reassured.  During her time in the emergency department, the patient was given a dose of Toradol with significant improvement of her symptoms.  Patient was reassured as the patient has focal tenderness over xiphoid process, I feel the patient has likely presenting with musculoskeletal chest pain.  The patient was given a prescription for naproxen for home use she was instructed to return to the emergency department for worsening symptoms but otherwise follow-up with primary care physician she expressed understanding and agreement.  Patient was then discharged home in otherwise stable condition.    Amount and/or Complexity of Data Reviewed  Labs: ordered. Decision-making details documented in ED Course.  Radiology: ordered. Decision-making details documented in ED Course.        Procedure  Procedures     Beau Jones MD  11/12/23 8699

## 2023-11-13 ENCOUNTER — OFFICE VISIT (OUTPATIENT)
Dept: PRIMARY CARE | Facility: CLINIC | Age: 33
End: 2023-11-13
Payer: COMMERCIAL

## 2023-11-13 VITALS
OXYGEN SATURATION: 98 % | HEART RATE: 83 BPM | SYSTOLIC BLOOD PRESSURE: 102 MMHG | HEIGHT: 61 IN | DIASTOLIC BLOOD PRESSURE: 70 MMHG | BODY MASS INDEX: 20.99 KG/M2 | TEMPERATURE: 98 F | RESPIRATION RATE: 16 BRPM | WEIGHT: 111.2 LBS

## 2023-11-13 DIAGNOSIS — E78.1 HYPERTRIGLYCERIDEMIA: ICD-10-CM

## 2023-11-13 DIAGNOSIS — R07.89 COSTOCHONDRAL CHEST PAIN: ICD-10-CM

## 2023-11-13 DIAGNOSIS — G43.709 CHRONIC MIGRAINE WITHOUT AURA WITHOUT STATUS MIGRAINOSUS, NOT INTRACTABLE: ICD-10-CM

## 2023-11-13 DIAGNOSIS — G63 POLYNEUROPATHY ASSOCIATED WITH UNDERLYING DISEASE (MULTI): ICD-10-CM

## 2023-11-13 DIAGNOSIS — R07.89 OTHER CHEST PAIN: Primary | ICD-10-CM

## 2023-11-13 DIAGNOSIS — G35 MULTIPLE SCLEROSIS (MULTI): ICD-10-CM

## 2023-11-13 DIAGNOSIS — F32.1 MODERATE MAJOR DEPRESSION (MULTI): ICD-10-CM

## 2023-11-13 DIAGNOSIS — D50.0 IRON DEFICIENCY ANEMIA DUE TO CHRONIC BLOOD LOSS: ICD-10-CM

## 2023-11-13 DIAGNOSIS — F41.9 ANXIETY: ICD-10-CM

## 2023-11-13 DIAGNOSIS — R11.0 NAUSEA: ICD-10-CM

## 2023-11-13 PROBLEM — Z23 ENCOUNTER FOR IMMUNIZATION: Status: ACTIVE | Noted: 2023-11-13

## 2023-11-13 PROCEDURE — 1036F TOBACCO NON-USER: CPT | Performed by: FAMILY MEDICINE

## 2023-11-13 PROCEDURE — 99214 OFFICE O/P EST MOD 30 MIN: CPT | Performed by: FAMILY MEDICINE

## 2023-11-13 PROCEDURE — 93000 ELECTROCARDIOGRAM COMPLETE: CPT | Performed by: FAMILY MEDICINE

## 2023-11-13 RX ORDER — IBUPROFEN 200 MG
200 TABLET ORAL
COMMUNITY

## 2023-11-13 RX ORDER — GABAPENTIN 300 MG/1
300 CAPSULE ORAL 3 TIMES DAILY
Qty: 270 CAPSULE | Refills: 1 | Status: SHIPPED | OUTPATIENT
Start: 2023-11-13 | End: 2024-05-15 | Stop reason: SDUPTHER

## 2023-11-13 RX ORDER — PROPRANOLOL HYDROCHLORIDE 10 MG/1
TABLET ORAL
COMMUNITY
Start: 2023-10-23 | End: 2023-11-13 | Stop reason: SDUPTHER

## 2023-11-13 RX ORDER — PREDNISONE 10 MG/1
TABLET ORAL
Qty: 45 TABLET | Refills: 0 | Status: SHIPPED | OUTPATIENT
Start: 2023-11-13 | End: 2023-11-27

## 2023-11-13 RX ORDER — PROPRANOLOL HYDROCHLORIDE 10 MG/1
10 TABLET ORAL 2 TIMES DAILY
Start: 2023-11-13

## 2023-11-13 RX ORDER — SUMATRIPTAN 50 MG/1
TABLET, FILM COATED ORAL
COMMUNITY
Start: 2019-09-25 | End: 2023-11-13

## 2023-11-13 RX ORDER — ACETAMINOPHEN 500 MG
TABLET ORAL
COMMUNITY

## 2023-11-13 RX ORDER — OCRELIZUMAB 300 MG/10ML
300 INJECTION INTRAVENOUS
Start: 2023-11-13

## 2023-11-13 ASSESSMENT — ENCOUNTER SYMPTOMS
FREQUENCY: 0
SORE THROAT: 0
HEMATURIA: 0
FEVER: 0
DYSURIA: 0
ABDOMINAL PAIN: 0
HEADACHES: 0
CHEST TIGHTNESS: 0
CONFUSION: 0
CHILLS: 0
SHORTNESS OF BREATH: 0
POLYPHAGIA: 0
DIARRHEA: 0
NAUSEA: 0
DIZZINESS: 0
CONSTIPATION: 0
DYSPHORIC MOOD: 0
UNEXPECTED WEIGHT CHANGE: 0
PALPITATIONS: 0
POLYDIPSIA: 0
LIGHT-HEADEDNESS: 0
DIAPHORESIS: 0
NERVOUS/ANXIOUS: 0
SINUS PAIN: 0
ADENOPATHY: 0
WHEEZING: 0
SINUS PRESSURE: 0
NUMBNESS: 0
VOMITING: 0
COUGH: 0

## 2023-11-13 NOTE — ASSESSMENT & PLAN NOTE
- prednisone 50 mg x 3 days, then 40 mg x 3 days, then 30 mg x 3 days, then 20 mg x 3 days, then 10 mg x 3 days

## 2023-11-13 NOTE — ASSESSMENT & PLAN NOTE
- recent hemoglobin better at 12.5 (11.6) (11.4)   - check iron studies   - likely secondary to menses and chronic illness (MS)  - continue ferrous sulfate 325 mg daily

## 2023-11-13 NOTE — ASSESSMENT & PLAN NOTE
- most likely musculoskeletal but need to rule out other causes  - EKG today shows NSR  - check stress test

## 2023-11-13 NOTE — PROGRESS NOTES
Subjective   Patient ID: Do Alex is a 33 y.o. female who presents for ER follow up for chest pain and iron level follow up (Refusing flu vaccine, pain level today is a 5, started a month ago).    HPI     routine follow up. chronic issues as per assessment and plan.     Was in ED 11/8/23 for chest pain, Labs as below. chest x-ray was negative. Was given toradol with pain relief. Chest pain was thought to be musculoskeletal.     She continues to have chest pain. It is painful for her to take deep breaths. Pain is in between her breasts. Pain has been present for about a month. Makes it difficult for her to sleep. Denies any injury. Pain does radiate to both arms and to her neck and the back of her shoulders. Does have some nausea associated with it and has even vomited once with it. She thought it was heartburn at first so took tums and prilosec without relief.     Admission on 11/08/2023, Discharged on 11/08/2023   Component Date Value Ref Range Status    HCG, Urine 11/08/2023 NEGATIVE  NEGATIVE Final    Color, Urine 11/08/2023 Yellow  Straw, Yellow Final    Appearance, Urine 11/08/2023 Hazy (N)  Clear Final    Specific Gravity, Urine 11/08/2023 1.019  1.005 - 1.035 Final    pH, Urine 11/08/2023 6.0  5.0, 5.5, 6.0, 6.5, 7.0, 7.5, 8.0 Final    Protein, Urine 11/08/2023 NEGATIVE  NEGATIVE mg/dL Final    Glucose, Urine 11/08/2023 NEGATIVE  NEGATIVE mg/dL Final    Blood, Urine 11/08/2023 NEGATIVE  NEGATIVE Final    Ketones, Urine 11/08/2023 NEGATIVE  NEGATIVE mg/dL Final    Bilirubin, Urine 11/08/2023 NEGATIVE  NEGATIVE Final    Urobilinogen, Urine 11/08/2023 <2.0  <2.0 mg/dL Final    Nitrite, Urine 11/08/2023 NEGATIVE  NEGATIVE Final    Leukocyte Esterase, Urine 11/08/2023 NEGATIVE  NEGATIVE Final    WBC 11/08/2023 7.0  4.4 - 11.3 x10*3/uL Final    nRBC 11/08/2023 0.0  0.0 - 0.0 /100 WBCs Final    RBC 11/08/2023 4.01  4.00 - 5.20 x10*6/uL Final    Hemoglobin 11/08/2023 12.5  12.0 - 16.0 g/dL Final    Hematocrit  11/08/2023 36.6  36.0 - 46.0 % Final    MCV 11/08/2023 91  80 - 100 fL Final    MCH 11/08/2023 31.2  26.0 - 34.0 pg Final    MCHC 11/08/2023 34.2  32.0 - 36.0 g/dL Final    RDW 11/08/2023 12.2  11.5 - 14.5 % Final    Platelets 11/08/2023 286  150 - 450 x10*3/uL Final    Neutrophils % 11/08/2023 55.4  40.0 - 80.0 % Final    Immature Granulocytes %, Automated 11/08/2023 0.1  0.0 - 0.9 % Final    Immature Granulocyte Count (IG) includes promyelocytes, myelocytes and metamyelocytes but does not include bands. Percent differential counts (%) should be interpreted in the context of the absolute cell counts (cells/UL).    Lymphocytes % 11/08/2023 29.0  13.0 - 44.0 % Final    Monocytes % 11/08/2023 10.7  2.0 - 10.0 % Final    Eosinophils % 11/08/2023 4.1  0.0 - 6.0 % Final    Basophils % 11/08/2023 0.7  0.0 - 2.0 % Final    Neutrophils Absolute 11/08/2023 3.86  1.20 - 7.70 x10*3/uL Final    Percent differential counts (%) should be interpreted in the context of the absolute cell counts (cells/uL).    Immature Granulocytes Absolute, Au* 11/08/2023 0.01  0.00 - 0.70 x10*3/uL Final    Lymphocytes Absolute 11/08/2023 2.03  1.20 - 4.80 x10*3/uL Final    Monocytes Absolute 11/08/2023 0.75  0.10 - 1.00 x10*3/uL Final    Eosinophils Absolute 11/08/2023 0.29  0.00 - 0.70 x10*3/uL Final    Basophils Absolute 11/08/2023 0.05  0.00 - 0.10 x10*3/uL Final    Glucose 11/08/2023 87  74 - 99 mg/dL Final    Sodium 11/08/2023 140  136 - 145 mmol/L Final    Potassium 11/08/2023 3.6  3.5 - 5.3 mmol/L Final    Chloride 11/08/2023 106  98 - 107 mmol/L Final    Bicarbonate 11/08/2023 29  21 - 32 mmol/L Final    Anion Gap 11/08/2023 9 (L)  10 - 20 mmol/L Final    Urea Nitrogen 11/08/2023 9  6 - 23 mg/dL Final    Creatinine 11/08/2023 0.73  0.50 - 1.05 mg/dL Final    eGFR 11/08/2023 >90  >60 mL/min/1.73m*2 Final    Calculations of estimated GFR are performed using the 2021 CKD-EPI Study Refit equation without the race variable for the IDMS-Traceable  "creatinine methods.  https://jasn.asnjournals.org/content/early/2021/09/22/ASN.5135122338    Calcium 11/08/2023 8.8  8.6 - 10.3 mg/dL Final    Albumin 11/08/2023 4.1  3.4 - 5.0 g/dL Final    Alkaline Phosphatase 11/08/2023 32 (L)  33 - 110 U/L Final    Total Protein 11/08/2023 6.1 (L)  6.4 - 8.2 g/dL Final    AST 11/08/2023 14  9 - 39 U/L Final    Bilirubin, Total 11/08/2023 0.4  0.0 - 1.2 mg/dL Final    ALT 11/08/2023 12  7 - 45 U/L Final    Patients treated with Sulfasalazine may generate falsely decreased results for ALT.    Lipase 11/08/2023 43  9 - 82 U/L Final        Review of Systems   Constitutional:  Negative for chills, diaphoresis, fever and unexpected weight change.   HENT:  Negative for congestion, sinus pressure, sinus pain, sneezing and sore throat.    Respiratory:  Negative for cough, chest tightness, shortness of breath and wheezing.    Cardiovascular:  Positive for chest pain. Negative for palpitations and leg swelling.   Gastrointestinal:  Negative for abdominal pain, constipation, diarrhea, nausea and vomiting.   Endocrine: Negative for cold intolerance, heat intolerance, polydipsia, polyphagia and polyuria.   Genitourinary:  Negative for dysuria, frequency, hematuria and urgency.   Neurological:  Negative for dizziness, syncope, light-headedness, numbness and headaches.   Hematological:  Negative for adenopathy.   Psychiatric/Behavioral:  Negative for confusion and dysphoric mood. The patient is not nervous/anxious.        Objective   /70 (BP Location: Right arm, Patient Position: Sitting, BP Cuff Size: Adult)   Pulse 83   Temp 36.7 °C (98 °F)   Resp 16   Ht 1.549 m (5' 1\")   Wt 50.4 kg (111 lb 3.2 oz)   SpO2 98%   BMI 21.01 kg/m²     Physical Exam  Vitals and nursing note reviewed.   Constitutional:       General: She is not in acute distress.     Appearance: Normal appearance.   HENT:      Head: Normocephalic and atraumatic.      Nose: Nose normal.   Eyes:      Extraocular " Movements: Extraocular movements intact.      Conjunctiva/sclera: Conjunctivae normal.      Pupils: Pupils are equal, round, and reactive to light.   Cardiovascular:      Rate and Rhythm: Normal rate and regular rhythm.      Heart sounds: No murmur heard.     No friction rub. No gallop.   Pulmonary:      Effort: Pulmonary effort is normal.      Breath sounds: Normal breath sounds. No wheezing, rhonchi or rales.   Abdominal:      General: Bowel sounds are normal. There is no distension.      Palpations: Abdomen is soft.      Tenderness: There is no abdominal tenderness.   Musculoskeletal:         General: Normal range of motion.      Cervical back: Normal range of motion and neck supple.   Skin:     General: Skin is warm and dry.   Neurological:      General: No focal deficit present.      Mental Status: She is alert and oriented to person, place, and time.      Deep Tendon Reflexes: Reflexes normal.   Psychiatric:         Mood and Affect: Mood normal.         Behavior: Behavior normal.         Thought Content: Thought content normal.         Judgment: Judgment normal.         Assessment/Plan   Problem List Items Addressed This Visit             ICD-10-CM    Anxiety F41.9     - controlled. Continue duloxetine, clonidine. Hydroxyzine as needed          Relevant Orders    CBC and Auto Differential    Chronic migraine without aura without status migrainosus, not intractable G43.709     - continue botox, continue propranolol  - continue relpax as needed          Relevant Medications    propranolol (Inderal) 10 mg tablet    onabotulinumtoxinA (Botox) 200 unit injection    Costochondral chest pain R07.89     - prednisone 50 mg x 3 days, then 40 mg x 3 days, then 30 mg x 3 days, then 20 mg x 3 days, then 10 mg x 3 days          Relevant Medications    predniSONE (Deltasone) 10 mg tablet    Hypertriglyceridemia E78.1    Relevant Orders    Comprehensive Metabolic Panel    Lipid Panel    TSH with reflex to Free T4 if abnormal     Iron deficiency anemia due to chronic blood loss D50.0     - recent hemoglobin better at 12.5 (11.6) (11.4)   - check iron studies   - likely secondary to menses and chronic illness (MS)  - continue ferrous sulfate 325 mg daily            Relevant Orders    Ferritin    Iron and TIBC    CBC and Auto Differential    Moderate major depression (CMS/HCC) F32.1     - controlled. Continue duloxetine and clonidine          Multiple sclerosis (CMS/HCC) G35     follows with neurology         Relevant Medications    ocrelizumab (Ocrevus) 30 mg/mL    Other Relevant Orders    CBC and Auto Differential    Comprehensive Metabolic Panel    TSH with reflex to Free T4 if abnormal    Vitamin D 25-Hydroxy,Total (for eval of Vitamin D levels)    Vitamin B12    Nausea R11.0     - check gallbladder US         Relevant Orders    US gallbladder    Other chest pain - Primary R07.89     - most likely musculoskeletal but need to rule out other causes  - EKG today shows NSR  - check stress test         Relevant Orders    ECG 12 Lead (Completed)    Nuclear Stress Test    Peripheral neuropathy G62.9     - stable. Continue neurontin         Relevant Medications    gabapentin (Neurontin) 300 mg capsule

## 2023-11-13 NOTE — PATIENT INSTRUCTIONS
Do Alex ,    Thank you for coming in today. We at [facility] appreciate your trust in our care. If you have any questions or concerns about the care you received today, please do not hesitate to contact us at [phone].    The following instructions were discussed today:    - prednisone 50 mg x 3 days, then 40 mg x 3 days, then 30 mg x 3 days, then 20 mg x 3 days, then 10 mg x 3 days   - get ultrasound of gallbaldder  - get stress test  - Follow up in 2 weeks for virtual visit to discuss chest pain   - Follow up in 6 months for in person visit   - Please get blood work done 1-2 weeks prior to your next in person visit. For blood work: Nothing to eat or drink for at least 10 hours prior. Okay for water or black coffee.

## 2023-11-16 ENCOUNTER — HOSPITAL ENCOUNTER (OUTPATIENT)
Dept: RADIOLOGY | Facility: HOSPITAL | Age: 33
Discharge: HOME | End: 2023-11-16
Payer: COMMERCIAL

## 2023-11-16 DIAGNOSIS — R11.0 NAUSEA: ICD-10-CM

## 2023-11-16 PROCEDURE — 76705 ECHO EXAM OF ABDOMEN: CPT | Performed by: RADIOLOGY

## 2023-11-16 PROCEDURE — 76705 ECHO EXAM OF ABDOMEN: CPT

## 2023-12-08 ENCOUNTER — TELEMEDICINE (OUTPATIENT)
Dept: PRIMARY CARE | Facility: CLINIC | Age: 33
End: 2023-12-08
Payer: COMMERCIAL

## 2023-12-08 DIAGNOSIS — R07.89 COSTOCHONDRAL CHEST PAIN: Primary | ICD-10-CM

## 2023-12-08 PROCEDURE — 99213 OFFICE O/P EST LOW 20 MIN: CPT | Performed by: FAMILY MEDICINE

## 2023-12-08 ASSESSMENT — ENCOUNTER SYMPTOMS
HEMATURIA: 0
DYSPHORIC MOOD: 0
UNEXPECTED WEIGHT CHANGE: 0
FREQUENCY: 0
FEVER: 0
POLYDIPSIA: 0
NAUSEA: 0
DIZZINESS: 0
CONSTIPATION: 0
CHILLS: 0
SINUS PRESSURE: 0
VOMITING: 0
PALPITATIONS: 0
HEADACHES: 0
CHEST TIGHTNESS: 0
COUGH: 0
LIGHT-HEADEDNESS: 0
SINUS PAIN: 0
DIARRHEA: 0
POLYPHAGIA: 0
ABDOMINAL PAIN: 0
DYSURIA: 0
SORE THROAT: 0
CONFUSION: 0
WHEEZING: 0
ADENOPATHY: 0
NERVOUS/ANXIOUS: 0
NUMBNESS: 0
SHORTNESS OF BREATH: 0
DIAPHORESIS: 0

## 2023-12-08 NOTE — PROGRESS NOTES
Subjective   Patient ID: Do Alex is a 33 y.o. female who presents for Chest Pain.    Virtual or Telephone Consent    An interactive audio and video telecommunication system which permits real time communications between the patient (at the originating site) and provider (at the distant site) was utilized to provide this telehealth service.   Verbal consent was requested and obtained from Do Alex on this date, 12/08/23 for a telehealth visit.      HPI  Follow up on chest pain. She states she is feeling much better after starting prednisone. She is now done with the prednisone. GB US was negative.     Review of Systems   Constitutional:  Negative for chills, diaphoresis, fever and unexpected weight change.   HENT:  Negative for congestion, sinus pressure, sinus pain, sneezing and sore throat.    Respiratory:  Negative for cough, chest tightness, shortness of breath and wheezing.    Cardiovascular:  Negative for chest pain, palpitations and leg swelling.   Gastrointestinal:  Negative for abdominal pain, constipation, diarrhea, nausea and vomiting.   Endocrine: Negative for cold intolerance, heat intolerance, polydipsia, polyphagia and polyuria.   Genitourinary:  Negative for dysuria, frequency, hematuria and urgency.   Neurological:  Negative for dizziness, syncope, light-headedness, numbness and headaches.   Hematological:  Negative for adenopathy.   Psychiatric/Behavioral:  Negative for confusion and dysphoric mood. The patient is not nervous/anxious.          Assessment/Plan   Problem List Items Addressed This Visit       Costochondral chest pain - Primary     - improved with prednisone   - no need to proceed with stress test

## 2024-05-15 ENCOUNTER — OFFICE VISIT (OUTPATIENT)
Dept: PRIMARY CARE | Facility: CLINIC | Age: 34
End: 2024-05-15
Payer: COMMERCIAL

## 2024-05-15 ENCOUNTER — LAB (OUTPATIENT)
Dept: LAB | Facility: LAB | Age: 34
End: 2024-05-15
Payer: COMMERCIAL

## 2024-05-15 VITALS
WEIGHT: 126 LBS | OXYGEN SATURATION: 98 % | SYSTOLIC BLOOD PRESSURE: 111 MMHG | HEART RATE: 83 BPM | HEIGHT: 61 IN | RESPIRATION RATE: 16 BRPM | BODY MASS INDEX: 23.79 KG/M2 | DIASTOLIC BLOOD PRESSURE: 72 MMHG

## 2024-05-15 DIAGNOSIS — G35 MULTIPLE SCLEROSIS (MULTI): ICD-10-CM

## 2024-05-15 DIAGNOSIS — Z11.4 ENCOUNTER FOR SCREENING FOR HIV: ICD-10-CM

## 2024-05-15 DIAGNOSIS — D50.0 IRON DEFICIENCY ANEMIA DUE TO CHRONIC BLOOD LOSS: ICD-10-CM

## 2024-05-15 DIAGNOSIS — E78.1 HYPERTRIGLYCERIDEMIA: ICD-10-CM

## 2024-05-15 DIAGNOSIS — G63 POLYNEUROPATHY ASSOCIATED WITH UNDERLYING DISEASE (MULTI): Primary | ICD-10-CM

## 2024-05-15 DIAGNOSIS — G43.709 CHRONIC MIGRAINE WITHOUT AURA WITHOUT STATUS MIGRAINOSUS, NOT INTRACTABLE: ICD-10-CM

## 2024-05-15 DIAGNOSIS — F41.9 ANXIETY: ICD-10-CM

## 2024-05-15 DIAGNOSIS — R53.82 CHRONIC FATIGUE: ICD-10-CM

## 2024-05-15 DIAGNOSIS — F32.1 MODERATE MAJOR DEPRESSION (MULTI): ICD-10-CM

## 2024-05-15 PROBLEM — R07.89 COSTOCHONDRAL CHEST PAIN: Status: RESOLVED | Noted: 2023-11-13 | Resolved: 2024-05-15

## 2024-05-15 PROBLEM — M54.2 NECK PAIN: Status: RESOLVED | Noted: 2023-04-04 | Resolved: 2024-05-15

## 2024-05-15 PROBLEM — R11.0 NAUSEA: Status: RESOLVED | Noted: 2023-11-13 | Resolved: 2024-05-15

## 2024-05-15 LAB
25(OH)D3 SERPL-MCNC: 31 NG/ML (ref 30–100)
ALBUMIN SERPL BCP-MCNC: 4.3 G/DL (ref 3.4–5)
ALP SERPL-CCNC: 40 U/L (ref 33–110)
ALT SERPL W P-5'-P-CCNC: 12 U/L (ref 7–45)
ANION GAP SERPL CALC-SCNC: 10 MMOL/L (ref 10–20)
AST SERPL W P-5'-P-CCNC: 13 U/L (ref 9–39)
BASOPHILS # BLD AUTO: 0.06 X10*3/UL (ref 0–0.1)
BASOPHILS NFR BLD AUTO: 0.8 %
BILIRUB SERPL-MCNC: 0.8 MG/DL (ref 0–1.2)
BUN SERPL-MCNC: 17 MG/DL (ref 6–23)
CALCIUM SERPL-MCNC: 9.1 MG/DL (ref 8.6–10.3)
CHLORIDE SERPL-SCNC: 104 MMOL/L (ref 98–107)
CHOLEST SERPL-MCNC: 186 MG/DL (ref 0–199)
CHOLEST SERPL-MCNC: 186 MG/DL (ref 0–199)
CHOLESTEROL/HDL RATIO: 3
CHOLESTEROL/HDL RATIO: 3
CO2 SERPL-SCNC: 28 MMOL/L (ref 21–32)
CREAT SERPL-MCNC: 0.84 MG/DL (ref 0.5–1.05)
EGFRCR SERPLBLD CKD-EPI 2021: >90 ML/MIN/1.73M*2
EOSINOPHIL # BLD AUTO: 0.29 X10*3/UL (ref 0–0.7)
EOSINOPHIL NFR BLD AUTO: 3.9 %
ERYTHROCYTE [DISTWIDTH] IN BLOOD BY AUTOMATED COUNT: 12 % (ref 11.5–14.5)
FERRITIN SERPL-MCNC: 30 NG/ML (ref 8–150)
GLUCOSE SERPL-MCNC: 71 MG/DL (ref 74–99)
HCT VFR BLD AUTO: 39.9 % (ref 36–46)
HDLC SERPL-MCNC: 62.5 MG/DL
HDLC SERPL-MCNC: 62.5 MG/DL
HGB BLD-MCNC: 13.8 G/DL (ref 12–16)
HIV 1+2 AB+HIV1 P24 AG SERPL QL IA: NONREACTIVE
IMM GRANULOCYTES # BLD AUTO: 0.02 X10*3/UL (ref 0–0.7)
IMM GRANULOCYTES NFR BLD AUTO: 0.3 % (ref 0–0.9)
IRON SATN MFR SERPL: 71 % (ref 25–45)
IRON SERPL-MCNC: 250 UG/DL (ref 35–150)
LDLC SERPL CALC-MCNC: 109 MG/DL
LYMPHOCYTES # BLD AUTO: 1.59 X10*3/UL (ref 1.2–4.8)
LYMPHOCYTES NFR BLD AUTO: 21.5 %
MCH RBC QN AUTO: 31.7 PG (ref 26–34)
MCHC RBC AUTO-ENTMCNC: 34.6 G/DL (ref 32–36)
MCV RBC AUTO: 92 FL (ref 80–100)
MONOCYTES # BLD AUTO: 0.8 X10*3/UL (ref 0.1–1)
MONOCYTES NFR BLD AUTO: 10.8 %
NEUTROPHILS # BLD AUTO: 4.62 X10*3/UL (ref 1.2–7.7)
NEUTROPHILS NFR BLD AUTO: 62.7 %
NON HDL CHOLESTEROL: 124 MG/DL (ref 0–149)
NON-HDL CHOLESTEROL: 124 MG/DL (ref 0–149)
NRBC BLD-RTO: 0 /100 WBCS (ref 0–0)
PLATELET # BLD AUTO: 290 X10*3/UL (ref 150–450)
POTASSIUM SERPL-SCNC: 4.4 MMOL/L (ref 3.5–5.3)
PROT SERPL-MCNC: 6.2 G/DL (ref 6.4–8.2)
RBC # BLD AUTO: 4.35 X10*6/UL (ref 4–5.2)
SODIUM SERPL-SCNC: 138 MMOL/L (ref 136–145)
TIBC SERPL-MCNC: 352 UG/DL (ref 240–445)
TRIGL SERPL-MCNC: 72 MG/DL (ref 0–149)
TSH SERPL-ACNC: 0.97 MIU/L (ref 0.44–3.98)
UIBC SERPL-MCNC: 102 UG/DL (ref 110–370)
VIT B12 SERPL-MCNC: 311 PG/ML (ref 211–911)
VLDL: 14 MG/DL (ref 0–40)
WBC # BLD AUTO: 7.4 X10*3/UL (ref 4.4–11.3)

## 2024-05-15 PROCEDURE — 82465 ASSAY BLD/SERUM CHOLESTEROL: CPT

## 2024-05-15 PROCEDURE — 36415 COLL VENOUS BLD VENIPUNCTURE: CPT

## 2024-05-15 PROCEDURE — 82607 VITAMIN B-12: CPT

## 2024-05-15 PROCEDURE — 83550 IRON BINDING TEST: CPT

## 2024-05-15 PROCEDURE — 99214 OFFICE O/P EST MOD 30 MIN: CPT | Performed by: FAMILY MEDICINE

## 2024-05-15 PROCEDURE — 82306 VITAMIN D 25 HYDROXY: CPT

## 2024-05-15 PROCEDURE — 84443 ASSAY THYROID STIM HORMONE: CPT

## 2024-05-15 PROCEDURE — 80061 LIPID PANEL: CPT

## 2024-05-15 PROCEDURE — 85025 COMPLETE CBC W/AUTO DIFF WBC: CPT

## 2024-05-15 PROCEDURE — 1036F TOBACCO NON-USER: CPT | Performed by: FAMILY MEDICINE

## 2024-05-15 PROCEDURE — 80053 COMPREHEN METABOLIC PANEL: CPT

## 2024-05-15 PROCEDURE — 83718 ASSAY OF LIPOPROTEIN: CPT

## 2024-05-15 PROCEDURE — 83540 ASSAY OF IRON: CPT

## 2024-05-15 PROCEDURE — 87389 HIV-1 AG W/HIV-1&-2 AB AG IA: CPT

## 2024-05-15 PROCEDURE — 82728 ASSAY OF FERRITIN: CPT

## 2024-05-15 RX ORDER — GABAPENTIN 300 MG/1
300 CAPSULE ORAL 3 TIMES DAILY
Qty: 270 CAPSULE | Refills: 1 | Status: SHIPPED | OUTPATIENT
Start: 2024-05-15 | End: 2024-11-11

## 2024-05-15 RX ORDER — DEXTROAMPHETAMINE SACCHARATE, AMPHETAMINE ASPARTATE MONOHYDRATE, DEXTROAMPHETAMINE SULFATE AND AMPHETAMINE SULFATE 7.5; 7.5; 7.5; 7.5 MG/1; MG/1; MG/1; MG/1
30 CAPSULE, EXTENDED RELEASE ORAL
COMMUNITY
Start: 2024-04-29

## 2024-05-15 RX ORDER — ARIPIPRAZOLE 5 MG/1
5 TABLET ORAL DAILY
COMMUNITY
Start: 2024-04-25

## 2024-05-15 ASSESSMENT — ENCOUNTER SYMPTOMS
LIGHT-HEADEDNESS: 0
CONSTIPATION: 0
CONFUSION: 0
VOMITING: 0
NUMBNESS: 0
SINUS PAIN: 0
SINUS PRESSURE: 0
DYSURIA: 0
HEMATURIA: 0
POLYPHAGIA: 0
PALPITATIONS: 0
FEVER: 0
DIARRHEA: 0
NERVOUS/ANXIOUS: 0
UNEXPECTED WEIGHT CHANGE: 0
ADENOPATHY: 0
CHILLS: 0
FREQUENCY: 0
POLYDIPSIA: 0
DIZZINESS: 0
SHORTNESS OF BREATH: 0
COUGH: 0
DIAPHORESIS: 0
HEADACHES: 0
ABDOMINAL PAIN: 0
DYSPHORIC MOOD: 0
CHEST TIGHTNESS: 0
WHEEZING: 0
SORE THROAT: 0
NAUSEA: 0

## 2024-05-15 NOTE — PATIENT INSTRUCTIONS
Do Alex ,    Thank you for coming in today. We at [facility] appreciate your trust in our care. If you have any questions or concerns about the care you received today, please do not hesitate to contact us at [phone].    The following instructions were discussed today:

## 2024-05-15 NOTE — ASSESSMENT & PLAN NOTE
- following with psychiatry   - current regimenL duloxetine 60 mg twice daily, abilify 5 mg daily

## 2024-05-15 NOTE — PROGRESS NOTES
"Subjective   Patient ID: Do Alex is a 34 y.o. female who presents for lab results.    [facility]   routine follow up. chronic issues as per assessment and plan.     Review of Systems   Constitutional:  Negative for chills, diaphoresis, fever and unexpected weight change.   HENT:  Negative for congestion, sinus pressure, sinus pain, sneezing and sore throat.    Respiratory:  Negative for cough, chest tightness, shortness of breath and wheezing.    Cardiovascular:  Negative for chest pain, palpitations and leg swelling.   Gastrointestinal:  Negative for abdominal pain, constipation, diarrhea, nausea and vomiting.   Endocrine: Negative for cold intolerance, heat intolerance, polydipsia, polyphagia and polyuria.   Genitourinary:  Negative for dysuria, frequency, hematuria and urgency.   Neurological:  Negative for dizziness, syncope, light-headedness, numbness and headaches.   Hematological:  Negative for adenopathy.   Psychiatric/Behavioral:  Negative for confusion and dysphoric mood. The patient is not nervous/anxious.        Objective   /72 (BP Location: Right arm, Patient Position: Sitting, BP Cuff Size: Adult)   Pulse 83   Resp 16   Ht 1.549 m (5' 1\")   Wt 57.2 kg (126 lb)   SpO2 98%   BMI 23.81 kg/m²     Physical Exam  Vitals and nursing note reviewed.   Constitutional:       General: She is not in acute distress.     Appearance: Normal appearance.   HENT:      Head: Normocephalic and atraumatic.      Nose: Nose normal.   Eyes:      Extraocular Movements: Extraocular movements intact.      Conjunctiva/sclera: Conjunctivae normal.      Pupils: Pupils are equal, round, and reactive to light.   Cardiovascular:      Rate and Rhythm: Normal rate and regular rhythm.      Heart sounds: No murmur heard.     No friction rub. No gallop.   Pulmonary:      Effort: Pulmonary effort is normal.      Breath sounds: Normal breath sounds. No wheezing, rhonchi or rales.   Abdominal:      General: Bowel sounds are " normal. There is no distension.      Palpations: Abdomen is soft.      Tenderness: There is no abdominal tenderness.   Musculoskeletal:         General: Normal range of motion.      Cervical back: Normal range of motion and neck supple.   Skin:     General: Skin is warm and dry.   Neurological:      General: No focal deficit present.      Mental Status: She is alert and oriented to person, place, and time.      Deep Tendon Reflexes: Reflexes normal.   Psychiatric:         Mood and Affect: Mood normal.         Behavior: Behavior normal.         Thought Content: Thought content normal.         Judgment: Judgment normal.         Assessment/Plan   Problem List Items Addressed This Visit             ICD-10-CM    Anxiety F41.9     - following with psychiatry   - current regimenL duloxetine 60 mg twice daily, abilify 5 mg daily          Relevant Medications    ARIPiprazole (Abilify) 5 mg tablet    Other Relevant Orders    CBC and Auto Differential    Chronic fatigue R53.82     - secondary to MS  - continue adderall          Relevant Medications    amphetamine-dextroamphetamine XR (Adderall XR) 30 mg 24 hr capsule    Chronic migraine without aura without status migrainosus, not intractable G43.709     - continue botox, continue propranolol  - continue relpax as needed          Hypertriglyceridemia E78.1    Relevant Orders    Comprehensive Metabolic Panel    TSH with reflex to Free T4 if abnormal    Lipid Panel Non-Fasting    Iron deficiency anemia due to chronic blood loss D50.0    Relevant Orders    Ferritin    Iron and TIBC    CBC and Auto Differential    Moderate major depression (Multi) F32.1     - following with psychiatry   - current regimenL duloxetine 60 mg twice daily, abilify 5 mg daily          Relevant Medications    ARIPiprazole (Abilify) 5 mg tablet    Multiple sclerosis (Multi) G35     follows with neurology         Relevant Medications    amphetamine-dextroamphetamine XR (Adderall XR) 30 mg 24 hr capsule     Other Relevant Orders    CBC and Auto Differential    Comprehensive Metabolic Panel    TSH with reflex to Free T4 if abnormal    Vitamin D 25-Hydroxy,Total (for eval of Vitamin D levels)    Vitamin B12    Peripheral neuropathy - Primary G62.9     - stable. Continue neurontin         Relevant Medications    gabapentin (Neurontin) 300 mg capsule     Other Visit Diagnoses         Codes    Encounter for screening for HIV     Z11.4    Relevant Orders    HIV 1/2 Antigen/Antibody Screen with Reflex to Confirmation

## 2024-11-19 ENCOUNTER — APPOINTMENT (OUTPATIENT)
Dept: PRIMARY CARE | Facility: CLINIC | Age: 34
End: 2024-11-19
Payer: COMMERCIAL

## 2024-11-19 VITALS
HEIGHT: 61 IN | HEART RATE: 74 BPM | RESPIRATION RATE: 16 BRPM | OXYGEN SATURATION: 99 % | WEIGHT: 138 LBS | SYSTOLIC BLOOD PRESSURE: 118 MMHG | BODY MASS INDEX: 26.06 KG/M2 | DIASTOLIC BLOOD PRESSURE: 80 MMHG

## 2024-11-19 DIAGNOSIS — E78.1 HYPERTRIGLYCERIDEMIA: ICD-10-CM

## 2024-11-19 DIAGNOSIS — F32.1 MODERATE MAJOR DEPRESSION (MULTI): ICD-10-CM

## 2024-11-19 DIAGNOSIS — D50.0 IRON DEFICIENCY ANEMIA DUE TO CHRONIC BLOOD LOSS: ICD-10-CM

## 2024-11-19 DIAGNOSIS — G43.709 CHRONIC MIGRAINE WITHOUT AURA WITHOUT STATUS MIGRAINOSUS, NOT INTRACTABLE: ICD-10-CM

## 2024-11-19 DIAGNOSIS — Z23 ENCOUNTER FOR IMMUNIZATION: ICD-10-CM

## 2024-11-19 DIAGNOSIS — Z00.00 WELL ADULT EXAM: Primary | ICD-10-CM

## 2024-11-19 DIAGNOSIS — G35 MULTIPLE SCLEROSIS (MULTI): ICD-10-CM

## 2024-11-19 DIAGNOSIS — G63 POLYNEUROPATHY ASSOCIATED WITH UNDERLYING DISEASE (MULTI): ICD-10-CM

## 2024-11-19 DIAGNOSIS — R53.82 CHRONIC FATIGUE: ICD-10-CM

## 2024-11-19 DIAGNOSIS — E66.3 OVERWEIGHT WITH BODY MASS INDEX (BMI) OF 26 TO 26.9 IN ADULT: ICD-10-CM

## 2024-11-19 DIAGNOSIS — F90.2 ATTENTION DEFICIT HYPERACTIVITY DISORDER, COMBINED TYPE: ICD-10-CM

## 2024-11-19 DIAGNOSIS — F41.9 ANXIETY: ICD-10-CM

## 2024-11-19 DIAGNOSIS — Z12.4 PAP SMEAR FOR CERVICAL CANCER SCREENING: ICD-10-CM

## 2024-11-19 PROCEDURE — 99214 OFFICE O/P EST MOD 30 MIN: CPT | Performed by: FAMILY MEDICINE

## 2024-11-19 PROCEDURE — 87624 HPV HI-RISK TYP POOLED RSLT: CPT

## 2024-11-19 PROCEDURE — 1036F TOBACCO NON-USER: CPT | Performed by: FAMILY MEDICINE

## 2024-11-19 PROCEDURE — 99395 PREV VISIT EST AGE 18-39: CPT | Performed by: FAMILY MEDICINE

## 2024-11-19 PROCEDURE — 3008F BODY MASS INDEX DOCD: CPT | Performed by: FAMILY MEDICINE

## 2024-11-19 RX ORDER — CHLORHEXIDINE GLUCONATE ORAL RINSE 1.2 MG/ML
SOLUTION DENTAL
COMMUNITY
Start: 2024-10-18

## 2024-11-19 RX ORDER — GABAPENTIN 300 MG/1
300 CAPSULE ORAL 3 TIMES DAILY
Qty: 270 CAPSULE | Refills: 1 | Status: SHIPPED | OUTPATIENT
Start: 2024-11-19 | End: 2025-05-18

## 2024-11-19 ASSESSMENT — ENCOUNTER SYMPTOMS
HEMATURIA: 0
PALPITATIONS: 0
NERVOUS/ANXIOUS: 0
VOMITING: 0
SINUS PAIN: 0
DYSURIA: 0
UNEXPECTED WEIGHT CHANGE: 0
NAUSEA: 0
DIARRHEA: 0
CONFUSION: 0
DYSPHORIC MOOD: 0
COUGH: 0
FEVER: 0
POLYDIPSIA: 0
LIGHT-HEADEDNESS: 0
ADENOPATHY: 0
SORE THROAT: 0
FREQUENCY: 0
POLYPHAGIA: 0
CHEST TIGHTNESS: 0
DIZZINESS: 0
HEADACHES: 0
WHEEZING: 0
CONSTIPATION: 0
NUMBNESS: 0
CHILLS: 0
DIAPHORESIS: 0
ABDOMINAL PAIN: 0
SINUS PRESSURE: 0
SHORTNESS OF BREATH: 0

## 2024-11-19 NOTE — PATIENT INSTRUCTIONS
Do Alex ,    Thank you for coming in today. We at [facility] appreciate your trust in our care. If you have any questions or concerns about the care you received today, please do not hesitate to contact us at [phone].    The following instructions were discussed today:    - Follow up in 6 months   - Please get blood work done 1-2 weeks prior to your next visit. For blood work: Nothing to eat or drink for at least 10 hours prior. Okay for water or black coffee.   - eye exams are recommended every 1-2 years for people without diabetes, and once yearly for people with diabetes  - dental exams are recommended every 6 months

## 2024-11-19 NOTE — PROGRESS NOTES
Subjective   Patient ID: Do Alex is a 34 y.o. female who presents for Gynecologic Exam (Refusing flu vaccine).    Well Adult Physical   Patient here for a comprehensive physical exam, pap, and routine follow up. chronic issues as per assessment and plan.     Do reports her health as Fair.    Patient presents today for pap and pelvic. Her most recent Pap smear was on 2018 and showed no abnormalities. Previous abnormal Pap smears: no. Contraception: none.    LMP: 10/29/24  : 3  Para: 2    Sexual Activity:   Not currently     History of Sexually Transmitted Illnesses:  none    Mammogram: Not indicated (age)    Colon Cancer Screening: Not indicated (age)       Does the patient take any herbs or supplements that were not prescribed by a doctor? no   Is the patiet taking calcium supplements? no   Is the patient taking aspirin daily? no    Outpatient Medications Prior to Visit   Medication Sig Dispense Refill    amphetamine-dextroamphetamine XR (Adderall XR) 30 mg 24 hr capsule Take 1 capsule (30 mg) by mouth once daily in the morning. Take before meals.      ARIPiprazole (Abilify) 5 mg tablet Take 1 tablet (5 mg) by mouth once daily.      chlorhexidine (Peridex) 0.12 % solution SWISH AND SPIT 15 ML BY MOUTH TWICE DAILY      cholecalciferol (Vitamin D-3) 50 mcg (2,000 unit) capsule Take by mouth.      cloNIDine (Catapres) 0.1 mg tablet Take 1 tablet (0.1 mg) by mouth once daily.      DULoxetine (Cymbalta) 60 mg DR capsule Take 1 capsule (60 mg) by mouth in the morning and 1 capsule (60 mg) before bedtime.      eletriptan (Relpax) 40 mg tablet Take 1 tablet (40 mg) by mouth 1 time if needed for migraine for up to 1 dose. 6 tablet     NON FORMULARY Medical Marijuana      ocrelizumab (Ocrevus) 30 mg/mL Infuse 10 mL (300 mg total) into a venous catheter every 6 months.      onabotulinumtoxinA (Botox) 200 unit injection 200 units every 3 months 1 each     propranolol (Inderal) 10 mg tablet Take 1 tablet (10 mg)  by mouth 2 times a day.      gabapentin (Neurontin) 300 mg capsule Take 1 capsule (300 mg) by mouth 3 times a day. 270 capsule 1    ibuprofen 200 mg tablet Take 1 tablet (200 mg) by mouth. (Patient not taking: Reported on 2024)       No facility-administered medications prior to visit.       Social History     Socioeconomic History    Marital status: Legally    Tobacco Use    Smoking status: Former     Current packs/day: 0.00     Types: Cigarettes     Start date:      Quit date: 2017     Years since quittin.8    Smokeless tobacco: Never   Vaping Use    Vaping status: Never Used   Substance and Sexual Activity    Alcohol use: Not Currently    Drug use: Not Currently       E-cigarette/Vaping       Questions Responses    E-cigarette/Vaping Use Never User            Last Dental Exam: more than 6 months    Last Eye Exam: more than 6 months    Diet: well balanced    Exercise: occasionally    Health Maintenance Due   Topic Date Due    MMR Vaccines (1 of 1 - Standard series) Never done    Varicella Vaccines (1 of 2 - 13+ 2-dose series) Never done    Hepatitis B Vaccines (1 of 3 - 19+ 3-dose series) Never done    COVID-19 Vaccine (2 - Gino risk series) 05/10/2021    Cervical Cancer Screening  2023    Diabetes Screening  2023    Influenza Vaccine (1) 2024            Review of Systems   Constitutional:  Negative for chills, diaphoresis, fever and unexpected weight change.   HENT:  Negative for congestion, sinus pressure, sinus pain, sneezing and sore throat.    Respiratory:  Negative for cough, chest tightness, shortness of breath and wheezing.    Cardiovascular:  Negative for chest pain, palpitations and leg swelling.   Gastrointestinal:  Negative for abdominal pain, constipation, diarrhea, nausea and vomiting.   Endocrine: Negative for cold intolerance, heat intolerance, polydipsia, polyphagia and polyuria.   Genitourinary:  Negative for dysuria, frequency, hematuria and urgency.  "  Neurological:  Negative for dizziness, syncope, light-headedness, numbness and headaches.   Hematological:  Negative for adenopathy.   Psychiatric/Behavioral:  Negative for confusion and dysphoric mood. The patient is not nervous/anxious.      Breasts  no lumps, mass, tenderness, no nipple discharge  Genitourinary   no urinary frequency, no dysuria, no hematuria, no incontinence, no vaginal discharge, and no unexplained vaginal bleeding. No pelvic pain       Objective   /80 (BP Location: Right arm, Patient Position: Sitting, BP Cuff Size: Adult)   Pulse 74   Resp 16   Ht 1.549 m (5' 1\")   Wt 62.6 kg (138 lb)   SpO2 99%   BMI 26.07 kg/m²     Physical Exam  Vitals and nursing note reviewed.   Constitutional:       General: She is not in acute distress.     Appearance: Normal appearance.   HENT:      Head: Normocephalic and atraumatic.      Nose: Nose normal.   Eyes:      Extraocular Movements: Extraocular movements intact.      Conjunctiva/sclera: Conjunctivae normal.      Pupils: Pupils are equal, round, and reactive to light.   Cardiovascular:      Rate and Rhythm: Normal rate and regular rhythm.      Heart sounds: No murmur heard.     No friction rub. No gallop.   Pulmonary:      Effort: Pulmonary effort is normal.      Breath sounds: Normal breath sounds. No wheezing, rhonchi or rales.   Chest:   Breasts:     Right: Normal. No inverted nipple, mass, nipple discharge, skin change or tenderness.      Left: Normal. No inverted nipple, mass, nipple discharge, skin change or tenderness.   Abdominal:      General: Bowel sounds are normal. There is no distension.      Palpations: Abdomen is soft.      Tenderness: There is no abdominal tenderness.      Hernia: There is no hernia in the left inguinal area or right inguinal area.   Genitourinary:     General: Normal vulva.      Pubic Area: No rash.       Labia:         Right: No rash, tenderness or lesion.         Left: No rash, tenderness or lesion.       " Urethra: No urethral pain or urethral lesion.      Vagina: Normal. No vaginal discharge, erythema, tenderness or bleeding.      Cervix: No cervical motion tenderness, discharge, lesion or cervical bleeding.      Uterus: Normal. Not tender and no uterine prolapse.       Adnexa: Right adnexa normal and left adnexa normal.        Right: No mass, tenderness or fullness.          Left: No mass, tenderness or fullness.     Musculoskeletal:         General: Normal range of motion.      Cervical back: Normal range of motion and neck supple.   Lymphadenopathy:      Upper Body:      Right upper body: No axillary adenopathy.      Left upper body: No axillary adenopathy.      Lower Body: No right inguinal adenopathy. No left inguinal adenopathy.   Skin:     General: Skin is warm and dry.   Neurological:      General: No focal deficit present.      Mental Status: She is alert and oriented to person, place, and time.      Deep Tendon Reflexes: Reflexes normal.   Psychiatric:         Mood and Affect: Mood normal.         Behavior: Behavior normal.         Thought Content: Thought content normal.         Judgment: Judgment normal.           Assessment/Plan   Problem List Items Addressed This Visit             ICD-10-CM    Anxiety F41.9     - following with psychiatry   - current regimen: duloxetine 60 mg twice daily, abilify 5 mg daily          Relevant Orders    CBC and Auto Differential    Comprehensive Metabolic Panel    TSH with reflex to Free T4 if abnormal    Attention deficit hyperactivity disorder, combined type F90.2     - follow with psychiatry   - on adderall          BMI 26.0-26.9,adult Z68.26     - Encouraged healthy lifestyle, including adequate exercise and high fiber, low fat and low carb diet.          Chronic fatigue R53.82     - secondary to MS  - continue adderall          Relevant Orders    CBC and Auto Differential    Comprehensive Metabolic Panel    TSH with reflex to Free T4 if abnormal    Chronic migraine  without aura without status migrainosus, not intractable G43.709     - continue botox, continue propranolol  - continue relpax as needed          Relevant Orders    CBC and Auto Differential    Comprehensive Metabolic Panel    TSH with reflex to Free T4 if abnormal    Encounter for immunization Z23     - Declined flu vaccine.           Hypertriglyceridemia E78.1     - Encouraged healthy lifestyle, including adequate exercise and high fiber, low fat and low carb diet.          Relevant Orders    Comprehensive Metabolic Panel    Lipid Panel    TSH with reflex to Free T4 if abnormal    Iron deficiency anemia due to chronic blood loss D50.0     - likely secondary to menses and chronic illness (MS)  - continue ferrous sulfate 325 mg daily            Relevant Orders    CBC and Auto Differential    Comprehensive Metabolic Panel    TSH with reflex to Free T4 if abnormal    Moderate major depression (Multi) F32.1     - following with psychiatry   - current regimen: duloxetine 60 mg twice daily, abilify 5 mg daily          Relevant Orders    CBC and Auto Differential    Comprehensive Metabolic Panel    TSH with reflex to Free T4 if abnormal    Multiple sclerosis (Multi) G35     follows with neurology         Relevant Orders    Vitamin D 25-Hydroxy,Total (for eval of Vitamin D levels)    Vitamin B12    CBC and Auto Differential    Comprehensive Metabolic Panel    TSH with reflex to Free T4 if abnormal    Overweight with body mass index (BMI) of 26 to 26.9 in adult E66.3, Z68.26     - Encouraged healthy lifestyle, including adequate exercise and high fiber, low fat and low carb diet.          Pap smear for cervical cancer screening Z12.4     - pap and pelvic performed today          Relevant Orders    THINPREP PAP TEST    Peripheral neuropathy G62.9     - stable. Continue neurontin         Relevant Medications    gabapentin (Neurontin) 300 mg capsule    Other Relevant Orders    Vitamin D 25-Hydroxy,Total (for eval of Vitamin D  levels)    Vitamin B12    CBC and Auto Differential    Comprehensive Metabolic Panel    TSH with reflex to Free T4 if abnormal     Other Visit Diagnoses         Codes    Well adult exam    -  Primary Z00.00

## 2024-11-19 NOTE — ASSESSMENT & PLAN NOTE
- following with psychiatry   - current regimen: duloxetine 60 mg twice daily, abilify 5 mg daily

## 2024-12-02 LAB
CYTOLOGY CMNT CVX/VAG CYTO-IMP: NORMAL
HPV HR 12 DNA GENITAL QL NAA+PROBE: NEGATIVE
HPV HR GENOTYPES PNL CVX NAA+PROBE: NEGATIVE
HPV16 DNA SPEC QL NAA+PROBE: NEGATIVE
HPV18 DNA SPEC QL NAA+PROBE: NEGATIVE
LAB AP HPV GENOTYPE QUESTION: YES
LAB AP HPV HR: NORMAL
LABORATORY COMMENT REPORT: NORMAL
PATH REPORT.TOTAL CANCER: NORMAL

## 2025-01-19 ENCOUNTER — APPOINTMENT (OUTPATIENT)
Dept: RADIOLOGY | Facility: HOSPITAL | Age: 35
End: 2025-01-19
Payer: MEDICAID

## 2025-01-19 ENCOUNTER — HOSPITAL ENCOUNTER (EMERGENCY)
Facility: HOSPITAL | Age: 35
Discharge: HOME | End: 2025-01-20
Attending: STUDENT IN AN ORGANIZED HEALTH CARE EDUCATION/TRAINING PROGRAM
Payer: MEDICAID

## 2025-01-19 VITALS
WEIGHT: 135 LBS | RESPIRATION RATE: 20 BRPM | HEART RATE: 107 BPM | BODY MASS INDEX: 25.49 KG/M2 | OXYGEN SATURATION: 98 % | HEIGHT: 61 IN | TEMPERATURE: 97.9 F | DIASTOLIC BLOOD PRESSURE: 81 MMHG | SYSTOLIC BLOOD PRESSURE: 110 MMHG

## 2025-01-19 DIAGNOSIS — J18.9 PNEUMONIA DUE TO INFECTIOUS ORGANISM, UNSPECIFIED LATERALITY, UNSPECIFIED PART OF LUNG: ICD-10-CM

## 2025-01-19 DIAGNOSIS — R11.2 NAUSEA AND VOMITING, UNSPECIFIED VOMITING TYPE: Primary | ICD-10-CM

## 2025-01-19 LAB
ALBUMIN SERPL BCP-MCNC: 4.8 G/DL (ref 3.4–5)
ALP SERPL-CCNC: 65 U/L (ref 33–110)
ALT SERPL W P-5'-P-CCNC: 12 U/L (ref 7–45)
ANION GAP SERPL CALC-SCNC: 15 MMOL/L (ref 10–20)
APPEARANCE UR: ABNORMAL
AST SERPL W P-5'-P-CCNC: 14 U/L (ref 9–39)
BASOPHILS # BLD AUTO: 0.04 X10*3/UL (ref 0–0.1)
BASOPHILS NFR BLD AUTO: 0.2 %
BILIRUB SERPL-MCNC: 1.2 MG/DL (ref 0–1.2)
BILIRUB UR STRIP.AUTO-MCNC: NEGATIVE MG/DL
BUN SERPL-MCNC: 17 MG/DL (ref 6–23)
CALCIUM SERPL-MCNC: 9.5 MG/DL (ref 8.6–10.3)
CHLORIDE SERPL-SCNC: 104 MMOL/L (ref 98–107)
CO2 SERPL-SCNC: 22 MMOL/L (ref 21–32)
COLOR UR: YELLOW
CREAT SERPL-MCNC: 0.86 MG/DL (ref 0.5–1.05)
EGFRCR SERPLBLD CKD-EPI 2021: 90 ML/MIN/1.73M*2
EOSINOPHIL # BLD AUTO: 0.21 X10*3/UL (ref 0–0.7)
EOSINOPHIL NFR BLD AUTO: 1.1 %
ERYTHROCYTE [DISTWIDTH] IN BLOOD BY AUTOMATED COUNT: 12.4 % (ref 11.5–14.5)
FLUAV RNA RESP QL NAA+PROBE: NOT DETECTED
FLUBV RNA RESP QL NAA+PROBE: NOT DETECTED
GLUCOSE SERPL-MCNC: 108 MG/DL (ref 74–99)
GLUCOSE UR STRIP.AUTO-MCNC: NORMAL MG/DL
HCG UR QL IA.RAPID: NEGATIVE
HCT VFR BLD AUTO: 44.4 % (ref 36–46)
HGB BLD-MCNC: 15.3 G/DL (ref 12–16)
IMM GRANULOCYTES # BLD AUTO: 0.07 X10*3/UL (ref 0–0.7)
IMM GRANULOCYTES NFR BLD AUTO: 0.4 % (ref 0–0.9)
KETONES UR STRIP.AUTO-MCNC: ABNORMAL MG/DL
LACTATE SERPL-SCNC: 1 MMOL/L (ref 0.4–2)
LEUKOCYTE ESTERASE UR QL STRIP.AUTO: NEGATIVE
LIPASE SERPL-CCNC: 26 U/L (ref 9–82)
LYMPHOCYTES # BLD AUTO: 0.31 X10*3/UL (ref 1.2–4.8)
LYMPHOCYTES NFR BLD AUTO: 1.6 %
MAGNESIUM SERPL-MCNC: 2.07 MG/DL (ref 1.6–2.4)
MCH RBC QN AUTO: 30.4 PG (ref 26–34)
MCHC RBC AUTO-ENTMCNC: 34.5 G/DL (ref 32–36)
MCV RBC AUTO: 88 FL (ref 80–100)
MONOCYTES # BLD AUTO: 0.67 X10*3/UL (ref 0.1–1)
MONOCYTES NFR BLD AUTO: 3.4 %
MUCOUS THREADS #/AREA URNS AUTO: NORMAL /LPF
NEUTROPHILS # BLD AUTO: 18.13 X10*3/UL (ref 1.2–7.7)
NEUTROPHILS NFR BLD AUTO: 93.3 %
NITRITE UR QL STRIP.AUTO: NEGATIVE
NRBC BLD-RTO: 0 /100 WBCS (ref 0–0)
PH UR STRIP.AUTO: 5.5 [PH]
PLATELET # BLD AUTO: 374 X10*3/UL (ref 150–450)
POTASSIUM SERPL-SCNC: 4.8 MMOL/L (ref 3.5–5.3)
PROT SERPL-MCNC: 8 G/DL (ref 6.4–8.2)
PROT UR STRIP.AUTO-MCNC: ABNORMAL MG/DL
RBC # BLD AUTO: 5.04 X10*6/UL (ref 4–5.2)
RBC # UR STRIP.AUTO: NEGATIVE /UL
RBC #/AREA URNS AUTO: NORMAL /HPF
SARS-COV-2 RNA RESP QL NAA+PROBE: NOT DETECTED
SODIUM SERPL-SCNC: 136 MMOL/L (ref 136–145)
SP GR UR STRIP.AUTO: 1.03
SQUAMOUS #/AREA URNS AUTO: NORMAL /HPF
UROBILINOGEN UR STRIP.AUTO-MCNC: NORMAL MG/DL
WBC # BLD AUTO: 19.4 X10*3/UL (ref 4.4–11.3)
WBC #/AREA URNS AUTO: NORMAL /HPF

## 2025-01-19 PROCEDURE — 96375 TX/PRO/DX INJ NEW DRUG ADDON: CPT

## 2025-01-19 PROCEDURE — 74177 CT ABD & PELVIS W/CONTRAST: CPT

## 2025-01-19 PROCEDURE — 80053 COMPREHEN METABOLIC PANEL: CPT | Performed by: STUDENT IN AN ORGANIZED HEALTH CARE EDUCATION/TRAINING PROGRAM

## 2025-01-19 PROCEDURE — 85025 COMPLETE CBC W/AUTO DIFF WBC: CPT | Performed by: STUDENT IN AN ORGANIZED HEALTH CARE EDUCATION/TRAINING PROGRAM

## 2025-01-19 PROCEDURE — 2550000001 HC RX 255 CONTRASTS: Performed by: STUDENT IN AN ORGANIZED HEALTH CARE EDUCATION/TRAINING PROGRAM

## 2025-01-19 PROCEDURE — 99285 EMERGENCY DEPT VISIT HI MDM: CPT | Mod: 25 | Performed by: STUDENT IN AN ORGANIZED HEALTH CARE EDUCATION/TRAINING PROGRAM

## 2025-01-19 PROCEDURE — 36415 COLL VENOUS BLD VENIPUNCTURE: CPT | Performed by: STUDENT IN AN ORGANIZED HEALTH CARE EDUCATION/TRAINING PROGRAM

## 2025-01-19 PROCEDURE — 81025 URINE PREGNANCY TEST: CPT | Performed by: STUDENT IN AN ORGANIZED HEALTH CARE EDUCATION/TRAINING PROGRAM

## 2025-01-19 PROCEDURE — 83735 ASSAY OF MAGNESIUM: CPT | Performed by: STUDENT IN AN ORGANIZED HEALTH CARE EDUCATION/TRAINING PROGRAM

## 2025-01-19 PROCEDURE — 83690 ASSAY OF LIPASE: CPT | Performed by: STUDENT IN AN ORGANIZED HEALTH CARE EDUCATION/TRAINING PROGRAM

## 2025-01-19 PROCEDURE — 2500000001 HC RX 250 WO HCPCS SELF ADMINISTERED DRUGS (ALT 637 FOR MEDICARE OP): Performed by: STUDENT IN AN ORGANIZED HEALTH CARE EDUCATION/TRAINING PROGRAM

## 2025-01-19 PROCEDURE — 87636 SARSCOV2 & INF A&B AMP PRB: CPT | Performed by: STUDENT IN AN ORGANIZED HEALTH CARE EDUCATION/TRAINING PROGRAM

## 2025-01-19 PROCEDURE — 83605 ASSAY OF LACTIC ACID: CPT | Performed by: STUDENT IN AN ORGANIZED HEALTH CARE EDUCATION/TRAINING PROGRAM

## 2025-01-19 PROCEDURE — 81001 URINALYSIS AUTO W/SCOPE: CPT | Performed by: STUDENT IN AN ORGANIZED HEALTH CARE EDUCATION/TRAINING PROGRAM

## 2025-01-19 PROCEDURE — 2500000004 HC RX 250 GENERAL PHARMACY W/ HCPCS (ALT 636 FOR OP/ED): Performed by: STUDENT IN AN ORGANIZED HEALTH CARE EDUCATION/TRAINING PROGRAM

## 2025-01-19 RX ORDER — ACETAMINOPHEN 325 MG/1
975 TABLET ORAL ONCE
Status: COMPLETED | OUTPATIENT
Start: 2025-01-19 | End: 2025-01-19

## 2025-01-19 RX ORDER — PROCHLORPERAZINE EDISYLATE 5 MG/ML
10 INJECTION INTRAMUSCULAR; INTRAVENOUS ONCE
Status: COMPLETED | OUTPATIENT
Start: 2025-01-19 | End: 2025-01-19

## 2025-01-19 RX ADMIN — IOHEXOL 75 ML: 350 INJECTION, SOLUTION INTRAVENOUS at 23:49

## 2025-01-19 RX ADMIN — SODIUM CHLORIDE 1000 ML: 9 INJECTION, SOLUTION INTRAVENOUS at 22:27

## 2025-01-19 RX ADMIN — PROCHLORPERAZINE EDISYLATE 10 MG: 5 INJECTION INTRAMUSCULAR; INTRAVENOUS at 22:41

## 2025-01-19 RX ADMIN — ACETAMINOPHEN 975 MG: 325 TABLET ORAL at 22:34

## 2025-01-19 ASSESSMENT — PAIN SCALES - GENERAL
PAINLEVEL_OUTOF10: 8
PAINLEVEL_OUTOF10: 10 - WORST POSSIBLE PAIN
PAINLEVEL_OUTOF10: 7

## 2025-01-19 ASSESSMENT — PAIN DESCRIPTION - ORIENTATION: ORIENTATION_2: UPPER

## 2025-01-19 ASSESSMENT — COLUMBIA-SUICIDE SEVERITY RATING SCALE - C-SSRS
1. IN THE PAST MONTH, HAVE YOU WISHED YOU WERE DEAD OR WISHED YOU COULD GO TO SLEEP AND NOT WAKE UP?: NO
6. HAVE YOU EVER DONE ANYTHING, STARTED TO DO ANYTHING, OR PREPARED TO DO ANYTHING TO END YOUR LIFE?: NO
2. HAVE YOU ACTUALLY HAD ANY THOUGHTS OF KILLING YOURSELF?: NO

## 2025-01-19 ASSESSMENT — LIFESTYLE VARIABLES
EVER FELT BAD OR GUILTY ABOUT YOUR DRINKING: NO
HAVE PEOPLE ANNOYED YOU BY CRITICIZING YOUR DRINKING: NO
EVER HAD A DRINK FIRST THING IN THE MORNING TO STEADY YOUR NERVES TO GET RID OF A HANGOVER: NO
HAVE YOU EVER FELT YOU SHOULD CUT DOWN ON YOUR DRINKING: NO
TOTAL SCORE: 0

## 2025-01-19 ASSESSMENT — PAIN DESCRIPTION - DESCRIPTORS
DESCRIPTORS: HEADACHE
DESCRIPTORS_2: ACHING;STABBING

## 2025-01-19 ASSESSMENT — PAIN DESCRIPTION - LOCATION
LOCATION_2: ABDOMEN
LOCATION: ABDOMEN
LOCATION: HEAD

## 2025-01-19 ASSESSMENT — PAIN DESCRIPTION - PAIN TYPE: TYPE: ACUTE PAIN

## 2025-01-19 ASSESSMENT — PAIN - FUNCTIONAL ASSESSMENT: PAIN_FUNCTIONAL_ASSESSMENT: 0-10

## 2025-01-19 ASSESSMENT — PAIN DESCRIPTION - FREQUENCY: FREQUENCY: CONSTANT/CONTINUOUS

## 2025-01-20 LAB — HOLD SPECIMEN: NORMAL

## 2025-01-20 PROCEDURE — 2500000004 HC RX 250 GENERAL PHARMACY W/ HCPCS (ALT 636 FOR OP/ED): Performed by: STUDENT IN AN ORGANIZED HEALTH CARE EDUCATION/TRAINING PROGRAM

## 2025-01-20 PROCEDURE — 74177 CT ABD & PELVIS W/CONTRAST: CPT | Mod: FOREIGN READ | Performed by: RADIOLOGY

## 2025-01-20 PROCEDURE — 2500000001 HC RX 250 WO HCPCS SELF ADMINISTERED DRUGS (ALT 637 FOR MEDICARE OP): Performed by: STUDENT IN AN ORGANIZED HEALTH CARE EDUCATION/TRAINING PROGRAM

## 2025-01-20 PROCEDURE — 96365 THER/PROPH/DIAG IV INF INIT: CPT

## 2025-01-20 RX ORDER — AMOXICILLIN AND CLAVULANATE POTASSIUM 875; 125 MG/1; MG/1
1 TABLET, FILM COATED ORAL ONCE
Status: COMPLETED | OUTPATIENT
Start: 2025-01-20 | End: 2025-01-20

## 2025-01-20 RX ORDER — AMOXICILLIN AND CLAVULANATE POTASSIUM 875; 125 MG/1; MG/1
1 TABLET, FILM COATED ORAL EVERY 12 HOURS
Qty: 20 TABLET | Refills: 0 | Status: SHIPPED | OUTPATIENT
Start: 2025-01-20 | End: 2025-01-30

## 2025-01-20 RX ORDER — DOXYCYCLINE 100 MG/1
100 TABLET ORAL 2 TIMES DAILY
Qty: 10 TABLET | Refills: 0 | Status: SHIPPED | OUTPATIENT
Start: 2025-01-20 | End: 2025-01-25

## 2025-01-20 RX ORDER — ONDANSETRON 4 MG/1
4 TABLET, ORALLY DISINTEGRATING ORAL EVERY 8 HOURS PRN
Qty: 15 TABLET | Refills: 0 | Status: SHIPPED | OUTPATIENT
Start: 2025-01-20

## 2025-01-20 RX ADMIN — DOXYCYCLINE 100 MG: 100 INJECTION, POWDER, LYOPHILIZED, FOR SOLUTION INTRAVENOUS at 01:16

## 2025-01-20 RX ADMIN — AMOXICILLIN AND CLAVULANATE POTASSIUM 1 TABLET: 875; 125 TABLET, FILM COATED ORAL at 01:16

## 2025-01-20 NOTE — ED PROVIDER NOTES
HPI   Chief Complaint   Patient presents with    Abdominal Pain    Vomiting    Headache    Diarrhea     Pt states ABD pain vomiting and diarrhea HA all day nothing taken for pain having chills but has not taken temp Hx MS       This is a 34-year-old female past medical history of anxiety, chronic fatigue, chronic migraine, deficiency anemia, multiple sclerosis, peripheral neuropathy who presents to the emergency department for abdominal pain, vomiting and headache. Patient states that her symptoms started today much episodes of vomiting and abdominal pain along with diarrhea.  She states this is typical for her headache but did not take any medications.  She has had an appendicitis in the past and an ectopic pregnancy.  She has no other complaints otherwise denies any fevers.                          Lidya Coma Scale Score: 15                  Patient History   Past Medical History:   Diagnosis Date    Carpal tunnel syndrome, bilateral upper limbs 09/25/2019    Bilateral carpal tunnel syndrome    Displaced fracture of fifth metatarsal bone, left foot, subsequent encounter for fracture with routine healing 11/24/2020    Closed displaced fracture of fifth metatarsal bone of left foot with routine healing, subsequent encounter    Personal history of other diseases of the respiratory system 03/30/2022    History of acute sinusitis    Personal history of other specified conditions 11/19/2020    History of dysuria    Personal history of urinary (tract) infections 11/24/2020    History of urinary tract infection    Personal history of urinary calculi     History of kidney stones    Postconcussional syndrome 09/25/2019    Post-concussion syndrome    Strain of muscle, fascia and tendon at neck level, initial encounter 09/25/2019    Strain of neck muscle, initial encounter     Past Surgical History:   Procedure Laterality Date    OTHER SURGICAL HISTORY  09/25/2019    Carpal tunnel surgery    OTHER SURGICAL HISTORY   2019    Appendectomy    OTHER SURGICAL HISTORY  2020    Ectopic pregnancy removal with salpingectomy    OTHER SURGICAL HISTORY  2020    Foot surgery     No family history on file.  Social History     Tobacco Use    Smoking status: Former     Current packs/day: 0.00     Types: Cigarettes     Start date:      Quit date:      Years since quittin.0    Smokeless tobacco: Never   Vaping Use    Vaping status: Never Used   Substance Use Topics    Alcohol use: Not Currently    Drug use: Yes     Types: Marijuana       Physical Exam   ED Triage Vitals [25 1935]   Temperature Heart Rate Respirations BP   36.6 °C (97.9 °F) (!) 107 20 110/81      Pulse Ox Temp Source Heart Rate Source Patient Position   98 % Oral Monitor Sitting      BP Location FiO2 (%)     Left arm --       Physical Exam  Labs Reviewed   SARS-COV-2 AND INFLUENZA A/B PCR - Normal       Result Value    Flu A Result Not Detected      Flu B Result Not Detected      Coronavirus , PCR Not Detected      Narrative:     This assay has received FDA Emergency Use Authorization (EUA) and  is only authorized for the duration of time that circumstances exist to justify the authorization of the emergency use of in vitro diagnostic tests for the detection of SARS-CoV-2 virus and/or diagnosis of COVID-19 infection under section 564(b)(1) of the Act, 21 U.S.C. 360bbb-3(b)(1). Testing for SARS-CoV-2 is only recommended for patients who meet current clinical and/or epidemiological criteria as defined by federal, state, or local public health directives. This assay is an in vitro diagnostic nucleic acid amplification test for the qualitative detection of SARS-CoV-2, Influenza A, and Influenza B from nasopharyngeal specimens and has been validated for use at [facility]. Negative results do not preclude COVID-19 infections or Influenza A/B infections, and should not be used as the sole basis for diagnosis, treatment, or  other management decisions. If Influenza A/B and RSV PCR results are negative, testing for Parainfluenza virus, Adenovirus and Metapneumovirus is routinely performed for [facility] pediatric oncology and intensive care inpatients, and is available on other patients by placing an add-on request.      No orders to display       ED Course & MDM   ED Course as of 01/20/25 0058   Sun Jan 19, 2025   2247 WBC(!): 19.4 [CF]   Mon Jan 20, 2025   0050 IMPRESSION:  Left lower lobe pneumonia.   [CF]      ED Course User Index  [CF] Lacie French MD       Medical Decision Making  This is a 34-year-old female presents emergency department for abdominal pain vomiting and a headache.  She is tachycardic.  On exam she has diffuse tenderness palpation of her abdomen.  She was given Compazine and fluids as well as Tylenol which improved her symptoms on reevaluation.  She did have a leukocytosis of 19.4 therefore a CT was obtained.  She had no electrolyte derangements otherwise in her urine does not appear infected although she does appear to have ketones in her urine most likely from dehydration.  CT of her abdomen showed no intra-abdominal pathology but she did have appears to be pneumonia the patient then states that she has been coughing as well.  She states that her symptoms are all gone at this time.  Will give patient antibiotics and Zofran.  She verbalizes any return precautions and will follow-up with her primary care provider.        Procedure  Procedures     Lacie French MD  01/20/25 0058

## 2025-05-21 ENCOUNTER — APPOINTMENT (OUTPATIENT)
Dept: PRIMARY CARE | Facility: CLINIC | Age: 35
End: 2025-05-21
Payer: MEDICAID

## 2025-09-22 ENCOUNTER — APPOINTMENT (OUTPATIENT)
Dept: PRIMARY CARE | Facility: CLINIC | Age: 35
End: 2025-09-22
Payer: MEDICAID

## 2025-11-20 ENCOUNTER — APPOINTMENT (OUTPATIENT)
Dept: PRIMARY CARE | Facility: CLINIC | Age: 35
End: 2025-11-20
Payer: MEDICAID

## (undated) DEVICE — GAUZE,SPONGE,4"X4",12PLY,STERILE,LF,2'S: Brand: MEDLINE

## (undated) DEVICE — BANDAGE ADH W1XL3IN NAT FAB WVN FLX DURABLE N ADH PD SEAL

## (undated) DEVICE — Z DISCONTINUED APPLICATOR SURG PREP 0.35OZ 2% CHG 70% ISO ALC W/ HI LT

## (undated) DEVICE — 6 ML SYRINGE LUER-LOCK TIP: Brand: MONOJECT

## (undated) DEVICE — NEEDLE HYPO 18GA L1.5IN PNK POLYPR HUB S STL THN WALL FILL

## (undated) DEVICE — NEEDLE HYPO 25GA L1.5IN BLU POLYPR HUB S STL REG BVL STR

## (undated) DEVICE — 12 ML SYRINGE,LUER-LOCK TIP: Brand: MONOJECT

## (undated) DEVICE — NON-DEHP CATHETER EXTENSION SET, MALE LUER LOCK ADAPTER

## (undated) DEVICE — SYRINGE, LUER LOCK, 5ML: Brand: MEDLINE

## (undated) DEVICE — 3M™ RED DOT™ MONITORING ELECTRODE WITH FOAM TAPE AND STICKY GEL 2560, 50/BAG, 20/CASE, 72/PLT: Brand: RED DOT™

## (undated) DEVICE — GLOVE ORANGE PI 7 1/2   MSG9075

## (undated) DEVICE — Device: Brand: PORTEX